# Patient Record
Sex: MALE | Race: OTHER | NOT HISPANIC OR LATINO
[De-identification: names, ages, dates, MRNs, and addresses within clinical notes are randomized per-mention and may not be internally consistent; named-entity substitution may affect disease eponyms.]

---

## 2020-01-20 ENCOUNTER — RESULT REVIEW (OUTPATIENT)
Age: 50
End: 2020-01-20

## 2020-03-18 ENCOUNTER — INPATIENT (INPATIENT)
Facility: HOSPITAL | Age: 50
LOS: 8 days | Discharge: HOME | DRG: 871 | End: 2020-03-27
Attending: HOSPITALIST | Admitting: HOSPITALIST
Payer: COMMERCIAL

## 2020-03-18 VITALS
RESPIRATION RATE: 20 BRPM | SYSTOLIC BLOOD PRESSURE: 149 MMHG | DIASTOLIC BLOOD PRESSURE: 85 MMHG | HEIGHT: 65 IN | OXYGEN SATURATION: 96 % | HEART RATE: 93 BPM | WEIGHT: 125 LBS | TEMPERATURE: 100 F

## 2020-03-18 DIAGNOSIS — Z29.9 ENCOUNTER FOR PROPHYLACTIC MEASURES, UNSPECIFIED: ICD-10-CM

## 2020-03-18 DIAGNOSIS — Z02.9 ENCOUNTER FOR ADMINISTRATIVE EXAMINATIONS, UNSPECIFIED: ICD-10-CM

## 2020-03-18 DIAGNOSIS — R56.9 UNSPECIFIED CONVULSIONS: ICD-10-CM

## 2020-03-18 DIAGNOSIS — A41.9 SEPSIS, UNSPECIFIED ORGANISM: ICD-10-CM

## 2020-03-18 DIAGNOSIS — R06.02 SHORTNESS OF BREATH: ICD-10-CM

## 2020-03-18 DIAGNOSIS — J18.9 PNEUMONIA, UNSPECIFIED ORGANISM: ICD-10-CM

## 2020-03-18 DIAGNOSIS — Z90.49 ACQUIRED ABSENCE OF OTHER SPECIFIED PARTS OF DIGESTIVE TRACT: Chronic | ICD-10-CM

## 2020-03-18 DIAGNOSIS — Z98.890 OTHER SPECIFIED POSTPROCEDURAL STATES: Chronic | ICD-10-CM

## 2020-03-18 DIAGNOSIS — J45.909 UNSPECIFIED ASTHMA, UNCOMPLICATED: ICD-10-CM

## 2020-03-18 LAB
ALBUMIN SERPL ELPH-MCNC: 4 G/DL — SIGNIFICANT CHANGE UP (ref 3.3–5)
ALP SERPL-CCNC: 83 U/L — SIGNIFICANT CHANGE UP (ref 40–120)
ALT FLD-CCNC: 34 U/L — SIGNIFICANT CHANGE UP (ref 10–45)
ANION GAP SERPL CALC-SCNC: 16 MMOL/L — SIGNIFICANT CHANGE UP (ref 5–17)
AST SERPL-CCNC: 37 U/L — SIGNIFICANT CHANGE UP (ref 10–40)
BASOPHILS # BLD AUTO: 0.01 K/UL — SIGNIFICANT CHANGE UP (ref 0–0.2)
BASOPHILS NFR BLD AUTO: 0.2 % — SIGNIFICANT CHANGE UP (ref 0–2)
BILIRUB SERPL-MCNC: 0.7 MG/DL — SIGNIFICANT CHANGE UP (ref 0.2–1.2)
BUN SERPL-MCNC: 16 MG/DL — SIGNIFICANT CHANGE UP (ref 7–23)
CALCIUM SERPL-MCNC: 9.3 MG/DL — SIGNIFICANT CHANGE UP (ref 8.4–10.5)
CHLORIDE SERPL-SCNC: 102 MMOL/L — SIGNIFICANT CHANGE UP (ref 96–108)
CO2 SERPL-SCNC: 21 MMOL/L — LOW (ref 22–31)
CREAT SERPL-MCNC: 0.94 MG/DL — SIGNIFICANT CHANGE UP (ref 0.5–1.3)
EOSINOPHIL # BLD AUTO: 0.01 K/UL — SIGNIFICANT CHANGE UP (ref 0–0.5)
EOSINOPHIL NFR BLD AUTO: 0.2 % — SIGNIFICANT CHANGE UP (ref 0–6)
GAS PNL BLDV: SIGNIFICANT CHANGE UP
GLUCOSE SERPL-MCNC: 95 MG/DL — SIGNIFICANT CHANGE UP (ref 70–99)
HCT VFR BLD CALC: 44.9 % — SIGNIFICANT CHANGE UP (ref 39–50)
HGB BLD-MCNC: 14.6 G/DL — SIGNIFICANT CHANGE UP (ref 13–17)
IMM GRANULOCYTES NFR BLD AUTO: 0.4 % — SIGNIFICANT CHANGE UP (ref 0–1.5)
LYMPHOCYTES # BLD AUTO: 1.03 K/UL — SIGNIFICANT CHANGE UP (ref 1–3.3)
LYMPHOCYTES # BLD AUTO: 18.2 % — SIGNIFICANT CHANGE UP (ref 13–44)
MCHC RBC-ENTMCNC: 29 PG — SIGNIFICANT CHANGE UP (ref 27–34)
MCHC RBC-ENTMCNC: 32.5 GM/DL — SIGNIFICANT CHANGE UP (ref 32–36)
MCV RBC AUTO: 89.1 FL — SIGNIFICANT CHANGE UP (ref 80–100)
MONOCYTES # BLD AUTO: 0.49 K/UL — SIGNIFICANT CHANGE UP (ref 0–0.9)
MONOCYTES NFR BLD AUTO: 8.6 % — SIGNIFICANT CHANGE UP (ref 2–14)
NEUTROPHILS # BLD AUTO: 4.11 K/UL — SIGNIFICANT CHANGE UP (ref 1.8–7.4)
NEUTROPHILS NFR BLD AUTO: 72.4 % — SIGNIFICANT CHANGE UP (ref 43–77)
NRBC # BLD: 0 /100 WBCS — SIGNIFICANT CHANGE UP (ref 0–0)
PLATELET # BLD AUTO: 146 K/UL — LOW (ref 150–400)
POTASSIUM SERPL-MCNC: 4.2 MMOL/L — SIGNIFICANT CHANGE UP (ref 3.5–5.3)
POTASSIUM SERPL-SCNC: 4.2 MMOL/L — SIGNIFICANT CHANGE UP (ref 3.5–5.3)
PROT SERPL-MCNC: 7.7 G/DL — SIGNIFICANT CHANGE UP (ref 6–8.3)
RAPID RVP RESULT: SIGNIFICANT CHANGE UP
RBC # BLD: 5.04 M/UL — SIGNIFICANT CHANGE UP (ref 4.2–5.8)
RBC # FLD: 11.9 % — SIGNIFICANT CHANGE UP (ref 10.3–14.5)
SODIUM SERPL-SCNC: 139 MMOL/L — SIGNIFICANT CHANGE UP (ref 135–145)
WBC # BLD: 5.67 K/UL — SIGNIFICANT CHANGE UP (ref 3.8–10.5)
WBC # FLD AUTO: 5.67 K/UL — SIGNIFICANT CHANGE UP (ref 3.8–10.5)

## 2020-03-18 PROCEDURE — 93010 ELECTROCARDIOGRAM REPORT: CPT

## 2020-03-18 PROCEDURE — 99284 EMERGENCY DEPT VISIT MOD MDM: CPT

## 2020-03-18 PROCEDURE — 99223 1ST HOSP IP/OBS HIGH 75: CPT | Mod: GC

## 2020-03-18 PROCEDURE — 71045 X-RAY EXAM CHEST 1 VIEW: CPT | Mod: 26

## 2020-03-18 RX ORDER — CEFTRIAXONE 500 MG/1
1000 INJECTION, POWDER, FOR SOLUTION INTRAMUSCULAR; INTRAVENOUS EVERY 24 HOURS
Refills: 0 | Status: DISCONTINUED | OUTPATIENT
Start: 2020-03-18 | End: 2020-03-19

## 2020-03-18 RX ORDER — ALBUTEROL 90 UG/1
2 AEROSOL, METERED ORAL EVERY 6 HOURS
Refills: 0 | Status: DISCONTINUED | OUTPATIENT
Start: 2020-03-18 | End: 2020-03-19

## 2020-03-18 RX ORDER — LEVETIRACETAM 250 MG/1
1 TABLET, FILM COATED ORAL
Qty: 0 | Refills: 0 | DISCHARGE

## 2020-03-18 RX ORDER — ALBUTEROL 90 UG/1
2 AEROSOL, METERED ORAL ONCE
Refills: 0 | Status: COMPLETED | OUTPATIENT
Start: 2020-03-18 | End: 2020-03-18

## 2020-03-18 RX ORDER — GUAIFENESIN/DEXTROMETHORPHAN 600MG-30MG
10 TABLET, EXTENDED RELEASE 12 HR ORAL EVERY 4 HOURS
Refills: 0 | Status: DISCONTINUED | OUTPATIENT
Start: 2020-03-18 | End: 2020-03-27

## 2020-03-18 RX ORDER — ENOXAPARIN SODIUM 100 MG/ML
40 INJECTION SUBCUTANEOUS DAILY
Refills: 0 | Status: DISCONTINUED | OUTPATIENT
Start: 2020-03-18 | End: 2020-03-27

## 2020-03-18 RX ORDER — AZITHROMYCIN 500 MG/1
500 TABLET, FILM COATED ORAL EVERY 24 HOURS
Refills: 0 | Status: DISCONTINUED | OUTPATIENT
Start: 2020-03-18 | End: 2020-03-19

## 2020-03-18 RX ORDER — LEVETIRACETAM 250 MG/1
1000 TABLET, FILM COATED ORAL
Refills: 0 | Status: DISCONTINUED | OUTPATIENT
Start: 2020-03-18 | End: 2020-03-18

## 2020-03-18 RX ORDER — LEVETIRACETAM 250 MG/1
500 TABLET, FILM COATED ORAL
Refills: 0 | Status: DISCONTINUED | OUTPATIENT
Start: 2020-03-18 | End: 2020-03-27

## 2020-03-18 RX ORDER — AZITHROMYCIN 500 MG/1
500 TABLET, FILM COATED ORAL ONCE
Refills: 0 | Status: COMPLETED | OUTPATIENT
Start: 2020-03-18 | End: 2020-03-18

## 2020-03-18 RX ORDER — CEFTRIAXONE 500 MG/1
1000 INJECTION, POWDER, FOR SOLUTION INTRAMUSCULAR; INTRAVENOUS ONCE
Refills: 0 | Status: COMPLETED | OUTPATIENT
Start: 2020-03-18 | End: 2020-03-18

## 2020-03-18 RX ORDER — ACETAMINOPHEN 500 MG
975 TABLET ORAL ONCE
Refills: 0 | Status: COMPLETED | OUTPATIENT
Start: 2020-03-18 | End: 2020-03-18

## 2020-03-18 RX ADMIN — Medication 975 MILLIGRAM(S): at 15:15

## 2020-03-18 RX ADMIN — ALBUTEROL 2 PUFF(S): 90 AEROSOL, METERED ORAL at 15:15

## 2020-03-18 RX ADMIN — CEFTRIAXONE 100 MILLIGRAM(S): 500 INJECTION, POWDER, FOR SOLUTION INTRAMUSCULAR; INTRAVENOUS at 17:27

## 2020-03-18 RX ADMIN — Medication 10 MILLILITER(S): at 22:48

## 2020-03-18 RX ADMIN — AZITHROMYCIN 500 MILLIGRAM(S): 500 TABLET, FILM COATED ORAL at 17:27

## 2020-03-18 RX ADMIN — ALBUTEROL 2 PUFF(S): 90 AEROSOL, METERED ORAL at 22:48

## 2020-03-18 RX ADMIN — Medication 975 MILLIGRAM(S): at 17:00

## 2020-03-18 RX ADMIN — LEVETIRACETAM 500 MILLIGRAM(S): 250 TABLET, FILM COATED ORAL at 21:44

## 2020-03-18 NOTE — H&P ADULT - PROBLEM SELECTOR PLAN 4
Does not appear to be in exacerbation, however prone to exacerbation given possible PNA vs viral illness  -c/w albuterol inhaler PRN (no duonebs) suspect coronavirus  -supportive care  -avoid Ibuprofen   -f/u COVID-19

## 2020-03-18 NOTE — ED ADULT NURSE REASSESSMENT NOTE - NS ED NURSE REASSESS COMMENT FT1
Pt given antibiotics for PNA. Shiloh ANTHONY states no blood cultures needed. Pt admitted to medicine for SOB and PNA. Airborne and contact precautions for r/o COVID. Pt resting calmly. VSS

## 2020-03-18 NOTE — ED PROVIDER NOTE - CLINICAL SUMMARY MEDICAL DECISION MAKING FREE TEXT BOX
Attending MD Matamoros: 49M with asthma presenting with cough and dyspnea x 2 days. CXR with multifocal PNA, suspicious for COVID-19 infection. Plan for COVID PCR testing, abx, admission

## 2020-03-18 NOTE — ED PROVIDER NOTE - ATTENDING CONTRIBUTION TO CARE
Attending MD Matamoros:   I personally have seen and examined this patient.  Physician assistant note reviewed and agree on plan of care and except where noted.  See HPI, PE, and MDM for details.

## 2020-03-18 NOTE — H&P ADULT - PROBLEM SELECTOR PLAN 5
-c/w keppra 500mg BID Does not appear to be in exacerbation, however prone to exacerbation given possible PNA vs viral illness  -c/w albuterol inhaler PRN (no duonebs)

## 2020-03-18 NOTE — H&P ADULT - PROBLEM SELECTOR PLAN 7
Transitions of Care Status:  1.  Name of PCP:  2.  PCP Contacted on Admission: [ ] Y    [ ] N    3.  PCP contacted at Discharge: [ ] Y    [ ] N    [ ] N/A  4.  Post-Discharge Appointment Date and Location:  5.  Summary of Handoff given to PCP: Diet: Regular  DVT ppx: HSQ

## 2020-03-18 NOTE — H&P ADULT - NSHPPHYSICALEXAM_GEN_ALL_CORE
Vital Signs Last 24 Hrs  T(C): 37.4 (18 Mar 2020 20:10), Max: 39.4 (18 Mar 2020 15:00)  T(F): 99.3 (18 Mar 2020 20:10), Max: 103 (18 Mar 2020 15:00)  HR: 88 (18 Mar 2020 20:10) (85 - 96)  BP: 135/84 (18 Mar 2020 20:10) (109/71 - 149/85)  BP(mean): --  RR: 24 (18 Mar 2020 20:10) (20 - 24)  SpO2: 99% (18 Mar 2020 20:10) (96% - 100%)    PHYSICAL EXAM:  GENERAL: ill appearing, well-developed  HEAD: Atraumatic, Normocephalic  EYES: EOMI, PERRLA, conjunctiva and sclera clear  NECK: Supple, No JVD  CHEST/LUNG: Diffuse wheezes; Tachypneic, No /rales/rhonchi  HEART: Regular rate and rhythm; No murmurs, rubs, or gallops  ABDOMEN: Soft, Nontender, Nondistended; Bowel sounds present  EXTREMITIES:  2+ dP pulses b/l, No clubbing, cyanosis, or edema  PSYCH: reactive affect  NEUROLOGY: AAOx3, non-focal  SKIN: No rashes or lesions Vital Signs Last 24 Hrs  T(C): 37.4 (18 Mar 2020 20:10), Max: 39.4 (18 Mar 2020 15:00)  T(F): 99.3 (18 Mar 2020 20:10), Max: 103 (18 Mar 2020 15:00)  HR: 88 (18 Mar 2020 20:10) (85 - 96)  BP: 135/84 (18 Mar 2020 20:10) (109/71 - 149/85)  RR: 24 (18 Mar 2020 20:10) (20 - 24)  SpO2: 99% (18 Mar 2020 20:10) (96% - 100%) on 2L nC    PHYSICAL EXAM:  GENERAL: ill appearing, well-developed  HEAD: Atraumatic, Normocephalic  EYES: EOMI, PERRLA, conjunctiva and sclera clear  NECK: Supple, No JVD  LUNG: Diffuse wheezes; Tachypneic, No /rales/rhonchi  HEART: Regular rate and rhythm; No murmurs, rubs, or gallops  ABDOMEN: Soft, Nontender, Nondistended; Bowel sounds present  EXTREMITIES:  2+ dP pulses b/l, No clubbing, cyanosis, or edema  PSYCH: reactive affect  NEUROLOGY: AAOx3, non-focal  SKIN: No rashes or lesions

## 2020-03-18 NOTE — ED PROVIDER NOTE - PROGRESS NOTE DETAILS
YAMILA Mart: This patient is a non-[facility] [facility] employee and was seen in the Emergency Department.  Staff did use appropriate PPE.

## 2020-03-18 NOTE — H&P ADULT - ATTENDING COMMENTS
49M w/ asthma and hx of meningioma c/b seizure s/p craniotomy presents w/ sob and found to have acute respiratory failure with hypoxia s/s left lung pneumonia c/b sepsis.  - concern for COVID19. PCR sent. isolation precautions. RVP neg. c/w ceftriaxone and azithromycin for now. CT chest pending to better characterize lungs. at time of my examination following resident presentation patient requiring 2L NC due to dyspnea and hypoxia.   - sepsis criteria met by fever, tachycardia, pneumonia, lactate wnl, patient appears euvolemic on exam without hypotension and would hold IV fluid bolus at this time. continue with close monitoring      Patient assigned to me by night hospitalist in charge for management and care for patient for this evening only. Care to be resumed by day hospitalist at 08:00 in the morning and thereafter.     Jean Claude Post MD  Department of Hospital Medicine  pager: [phone] (available from 20:00 to 08:00)

## 2020-03-18 NOTE — ED ADULT NURSE NOTE - OBJECTIVE STATEMENT
49y male coming in from home c/o flu-like symptoms. A&Ox3. Hx of asthma. Pt reports flu-like symptoms x few days. 49y male coming in from home c/o flu-like symptoms. A&Ox3 and febrile. Hx of asthma for which he takes albuterol (denies hospitalizations and intubation). Pt reports flu-like symptoms x few days including intermittent sob worsening x 1 week, productive cough x few days (pt swallows mucus, not sure of color) and fever today, Tmax 100.5F. Denies improvement from nebulizers at home. Pt reports taking motrin q4 hours. Denies recent travel or sick contacts. Pt reports shopping in [employer] for 3 hour a few days ago. Denies chest pain. Wheezing noted b/l. COVID tested by MD today in ED.

## 2020-03-18 NOTE — ED PROVIDER NOTE - OBJECTIVE STATEMENT
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 Patient reported dyspnea with speaking, dry non productive cough, wheezing, and minimal improvement with inhaler taken BID. Patient now became febrile Tmax 101 orally and associated body aches. Patient has been taking Motrin every 4 hours around the clock with no improvement. Patient wife is sick with a "cold". Patient stated he is prone to bronchitis and pneumonia. Had the flu shot this year. Patient is a non smoker. Patient denied CP, abdominal pain, rash, contact with known COVID pts, travel, N/V/D, headache, neck pain, palpitations

## 2020-03-18 NOTE — H&P ADULT - NSHPLABSRESULTS_GEN_ALL_CORE
EKG:  NSR, RBBB, VR 93, , , QT//429 Personally reviewed available labs, imaging and ekg     Labs  CBC Full  -  ( 18 Mar 2020 15:32 )  WBC Count : 5.67 K/uL  RBC Count : 5.04 M/uL  Hemoglobin : 14.6 g/dL  Hematocrit : 44.9 %  Platelet Count - Automated : 146 K/uL  Mean Cell Volume : 89.1 fl  Mean Cell Hemoglobin : 29.0 pg  Mean Cell Hemoglobin Concentration : 32.5 gm/dL  Auto Neutrophil # : 4.11 K/uL  Auto Lymphocyte # : 1.03 K/uL  Auto Monocyte # : 0.49 K/uL  Auto Eosinophil # : 0.01 K/uL  Auto Basophil # : 0.01 K/uL  Auto Neutrophil % : 72.4 %  Auto Lymphocyte % : 18.2 %  Auto Monocyte % : 8.6 %  Auto Eosinophil % : 0.2 %  Auto Basophil % : 0.2 %    03-18    139  |  102  |  16  ----------------------------<  95  4.2   |  21<L>  |  0.94    Ca    9.3      18 Mar 2020 15:32    TPro  7.7  /  Alb  4.0  /  TBili  0.7  /  DBili  x   /  AST  37  /  ALT  34  /  AlkPhos  83  03-18        CAPILLARY BLOOD GLUCOSE  15:32 - VBG - pH: 7.42  | pCO2: 43    | pO2: 38    | Lactate: 1.2      Rapid RVP Result: NotDetec: (03.18.20 @ 15:32)      Imaging  CXR with left lung hazy opacification  EKG:  NSR, RBBB, VR 93, , , QT//429

## 2020-03-18 NOTE — H&P ADULT - HISTORY OF PRESENT ILLNESS
49 year old male with asthma on prn albuterol (never hospitalized/ never intubated), meningioma s/p cranioplasty c/b seizures (on keppra 1g BID), presenting with complaints of worsening shortness of breath. Patient stated that he first noticed  shortness of breath and non-productive cough for the past week, and has been experiencing fevers for the past two days. His shortness of breath has progressively worsened  to the point were it is affecting his ability to speak, and is also associated with wheezing. He only achieved minimal improvement with inhaler taken twice daily. He reported fevers up to 101 orally, for which he has been taking Ibuprofen every 4 hours, without significant improvement. He also endorsed generalized body aches. Patient reported that he is prone to bronchitis and pneumonia. He stated that is wife is sick with a "cold." He denied any  associated CP, palpitations, abdominal pain, nausea, vomiting, diarrhea. No recent travel. Of note patient works for [employer], but has been working from home for the past two weeks. 49 year old male with asthma on prn albuterol (never hospitalized/ never intubated), meningioma s/p cranioplasty c/b seizures (on keppra 500mg BID), presenting with complaints of worsening shortness of breath. Patient stated that he first noticed  shortness of breath and non-productive cough for the past week, and has been experiencing fevers for the past two days. His shortness of breath has progressively worsened  to the point were it is affecting his ability to speak, and is also associated with wheezing. He only achieved minimal improvement with inhaler taken twice daily. He reported fevers up to 101 orally, for which he has been taking Ibuprofen every 4 hours, without significant improvement. He also endorsed generalized body aches. Patient reported that he is prone to bronchitis and pneumonia. He stated that is wife is sick with a "cold." He denied any  associated CP, palpitations, abdominal pain, nausea, vomiting, diarrhea. No recent travel. Of note patient works for [employer], but has been working from home for the past two weeks. 49 year old male with asthma on prn albuterol (never hospitalized/ never intubated), meningioma s/p cranioplasty c/b seizures (on keppra 500mg BID), presenting with complaints of worsening shortness of breath. Patient stated that he first noticed  shortness of breath and non-productive cough for the past week, and has been experiencing fevers for the past two days. His shortness of breath has progressively worsened  to the point were it is affecting his ability to speak, and is also associated with wheezing. He only achieved minimal improvement with inhaler taken twice daily. He reported fevers up to 101 orally, for which he has been taking Ibuprofen every 4 hours, without significant improvement. He also endorsed generalized body aches. Patient reported that he is prone to bronchitis and pneumonia. He stated that is wife is sick with a "cold" (patient's wife is a teacher). He denied any  associated CP, palpitations, abdominal pain, nausea, vomiting, diarrhea. No recent travel. Of note patient works for [employer], but has been working from home for the past two weeks.

## 2020-03-18 NOTE — ED ADULT NURSE REASSESSMENT NOTE - NS ED NURSE REASSESS COMMENT FT1
Report already given to [facility]. Pt awaiting transport to admitted bed. Report also given to ED RN Caleb to ensure safe transport up to admitted bed. VSS

## 2020-03-18 NOTE — H&P ADULT - PROBLEM SELECTOR PLAN 2
Pt with SOB  suspect in the setting of PNA vs viral illness  -monitor O2 and give supplemental oxygen as indicated  -further management asa above see above  - ct chest to better characterize

## 2020-03-18 NOTE — H&P ADULT - PROBLEM SELECTOR PLAN 1
Pt with sepsis (febrile, tachypneic, HR >90) with suspected lung infection. PNA vs viral syndrome (r/o COVID). CXR with Hazy opacity in the left lower lung  -s/p ceftriaxone and azithro in the ED, continue   -f/u bcx, COVID-19  -f/u CT chest hypoxia w/ left lung opacification c/w pneumonia still requires eval for viral vs bacterial  -initially had dyspnea and when traveled from ED to floor found to have hypoxia now requiring 2LNC. discussed with RN that if hypoxia worsens to notify covering team  - concern for viral infection. COVID19 PCR collected. pending CT chest to better characterize  - c/w ceftriaxone and azithromycin

## 2020-03-18 NOTE — H&P ADULT - PROBLEM SELECTOR PLAN 3
suspect coronavirus  -supportive care  -avoid Ibuprofen   -f/u COVID-19 Pt with sepsis (febrile, tachypneic, HR >90) with suspected lung infection. PNA vs viral syndrome (r/o COVID). CXR with Hazy opacity in the left lower lung  -s/p ceftriaxone and azithro in the ED, continue   -f/u bcx, COVID-19  -f/u CT chest

## 2020-03-18 NOTE — ED ADULT NURSE REASSESSMENT NOTE - NS ED NURSE REASSESS COMMENT FT1
[employer] food provided. Pt receive admitted bed assignment on [facility]. Report given to Rodolfo MUNGUIA. Barron DRISCOLL states Chest CT may be performed when pt upstairs in admitted bed. VSS

## 2020-03-18 NOTE — H&P ADULT - NSHPREVIEWOFSYSTEMS_GEN_ALL_CORE
Constitutional: chills, night sweats, weight loss. +fevers,  HEENT: denies visual changes, hearing changes, rhinitis, odynophagia, or dysphagia  Cardiovascular: denies palpitations, chest pain, edema  Respiratory: +SOB, +wheezing, +cough  Gastrointestinal: denies N/V/D, abdominal pain, hematochezia, melena  : denies dysuria, hematuria  MSK: denies weakness, +myalgias  Neuro: no numbness or tingling  Psych: no depression or anxiety  Skin: denies new rashes or masses Constitutional: chills, night sweats, weight loss. +fevers,  HEENT: denies visual changes, hearing changes, rhinitis, odynophagia, or dysphagia  Cardiovascular: denies palpitations, chest pain, edema  Respiratory: +SOB, +wheezing, +cough  Gastrointestinal: denies N/V/D, abdominal pain, hematochezia, melena  : denies dysuria, hematuria  MSK: denies weakness, +myalgias  Neuro: no numbness or tingling  Psych: no depression or anxiety  Skin: denies new rashes or masses  Heme: no easy bruising and no bleeding

## 2020-03-18 NOTE — H&P ADULT - ASSESSMENT
49 year old male with asthma on prn albuterol (never hospitalized/ never intubated), meningioma s/p cranioplasty c/b seizures (on keppra 1g BID), presenting with complaints of worsening shortness of breath. Admitted for sepsis, suspect in the setting of lung pathology PNA vs viral (r/o COVID-19)

## 2020-03-19 DIAGNOSIS — J18.9 PNEUMONIA, UNSPECIFIED ORGANISM: ICD-10-CM

## 2020-03-19 DIAGNOSIS — J96.01 ACUTE RESPIRATORY FAILURE WITH HYPOXIA: ICD-10-CM

## 2020-03-19 LAB
ALBUMIN SERPL ELPH-MCNC: 3.8 G/DL — SIGNIFICANT CHANGE UP (ref 3.3–5)
ALP SERPL-CCNC: 73 U/L — SIGNIFICANT CHANGE UP (ref 40–120)
ALT FLD-CCNC: 34 U/L — SIGNIFICANT CHANGE UP (ref 10–45)
ANION GAP SERPL CALC-SCNC: 15 MMOL/L — SIGNIFICANT CHANGE UP (ref 5–17)
AST SERPL-CCNC: 33 U/L — SIGNIFICANT CHANGE UP (ref 10–40)
BASOPHILS # BLD AUTO: 0.01 K/UL — SIGNIFICANT CHANGE UP (ref 0–0.2)
BASOPHILS NFR BLD AUTO: 0.2 % — SIGNIFICANT CHANGE UP (ref 0–2)
BILIRUB SERPL-MCNC: 0.7 MG/DL — SIGNIFICANT CHANGE UP (ref 0.2–1.2)
BUN SERPL-MCNC: 16 MG/DL — SIGNIFICANT CHANGE UP (ref 7–23)
CALCIUM SERPL-MCNC: 8.7 MG/DL — SIGNIFICANT CHANGE UP (ref 8.4–10.5)
CHLORIDE SERPL-SCNC: 98 MMOL/L — SIGNIFICANT CHANGE UP (ref 96–108)
CO2 SERPL-SCNC: 21 MMOL/L — LOW (ref 22–31)
CREAT SERPL-MCNC: 1 MG/DL — SIGNIFICANT CHANGE UP (ref 0.5–1.3)
EOSINOPHIL # BLD AUTO: 0 K/UL — SIGNIFICANT CHANGE UP (ref 0–0.5)
EOSINOPHIL NFR BLD AUTO: 0 % — SIGNIFICANT CHANGE UP (ref 0–6)
GLUCOSE SERPL-MCNC: 102 MG/DL — HIGH (ref 70–99)
HCT VFR BLD CALC: 39.8 % — SIGNIFICANT CHANGE UP (ref 39–50)
HGB BLD-MCNC: 13.1 G/DL — SIGNIFICANT CHANGE UP (ref 13–17)
IMM GRANULOCYTES NFR BLD AUTO: 0.5 % — SIGNIFICANT CHANGE UP (ref 0–1.5)
LYMPHOCYTES # BLD AUTO: 1.01 K/UL — SIGNIFICANT CHANGE UP (ref 1–3.3)
LYMPHOCYTES # BLD AUTO: 17.1 % — SIGNIFICANT CHANGE UP (ref 13–44)
MAGNESIUM SERPL-MCNC: 1.7 MG/DL — SIGNIFICANT CHANGE UP (ref 1.6–2.6)
MCHC RBC-ENTMCNC: 28.9 PG — SIGNIFICANT CHANGE UP (ref 27–34)
MCHC RBC-ENTMCNC: 32.9 GM/DL — SIGNIFICANT CHANGE UP (ref 32–36)
MCV RBC AUTO: 87.9 FL — SIGNIFICANT CHANGE UP (ref 80–100)
MONOCYTES # BLD AUTO: 0.5 K/UL — SIGNIFICANT CHANGE UP (ref 0–0.9)
MONOCYTES NFR BLD AUTO: 8.5 % — SIGNIFICANT CHANGE UP (ref 2–14)
NEUTROPHILS # BLD AUTO: 4.34 K/UL — SIGNIFICANT CHANGE UP (ref 1.8–7.4)
NEUTROPHILS NFR BLD AUTO: 73.7 % — SIGNIFICANT CHANGE UP (ref 43–77)
NRBC # BLD: 0 /100 WBCS — SIGNIFICANT CHANGE UP (ref 0–0)
PHOSPHATE SERPL-MCNC: 2.9 MG/DL — SIGNIFICANT CHANGE UP (ref 2.5–4.5)
PLATELET # BLD AUTO: 149 K/UL — LOW (ref 150–400)
POTASSIUM SERPL-MCNC: 3.5 MMOL/L — SIGNIFICANT CHANGE UP (ref 3.5–5.3)
POTASSIUM SERPL-SCNC: 3.5 MMOL/L — SIGNIFICANT CHANGE UP (ref 3.5–5.3)
PROT SERPL-MCNC: 7.1 G/DL — SIGNIFICANT CHANGE UP (ref 6–8.3)
RBC # BLD: 4.53 M/UL — SIGNIFICANT CHANGE UP (ref 4.2–5.8)
RBC # FLD: 11.9 % — SIGNIFICANT CHANGE UP (ref 10.3–14.5)
SARS-COV-2 RNA SPEC QL NAA+PROBE: DETECTED
SODIUM SERPL-SCNC: 134 MMOL/L — LOW (ref 135–145)
WBC # BLD: 5.89 K/UL — SIGNIFICANT CHANGE UP (ref 3.8–10.5)
WBC # FLD AUTO: 5.89 K/UL — SIGNIFICANT CHANGE UP (ref 3.8–10.5)

## 2020-03-19 PROCEDURE — 71250 CT THORAX DX C-: CPT | Mod: 26

## 2020-03-19 PROCEDURE — 99233 SBSQ HOSP IP/OBS HIGH 50: CPT

## 2020-03-19 RX ORDER — ALBUTEROL 90 UG/1
2 AEROSOL, METERED ORAL EVERY 4 HOURS
Refills: 0 | Status: DISCONTINUED | OUTPATIENT
Start: 2020-03-19 | End: 2020-03-27

## 2020-03-19 RX ORDER — ACETAMINOPHEN 500 MG
650 TABLET ORAL ONCE
Refills: 0 | Status: COMPLETED | OUTPATIENT
Start: 2020-03-19 | End: 2020-03-19

## 2020-03-19 RX ADMIN — LEVETIRACETAM 500 MILLIGRAM(S): 250 TABLET, FILM COATED ORAL at 17:02

## 2020-03-19 RX ADMIN — LEVETIRACETAM 500 MILLIGRAM(S): 250 TABLET, FILM COATED ORAL at 06:24

## 2020-03-19 RX ADMIN — ENOXAPARIN SODIUM 40 MILLIGRAM(S): 100 INJECTION SUBCUTANEOUS at 12:02

## 2020-03-19 RX ADMIN — Medication 650 MILLIGRAM(S): at 20:49

## 2020-03-19 RX ADMIN — Medication 10 MILLILITER(S): at 17:30

## 2020-03-19 NOTE — PROGRESS NOTE ADULT - SUBJECTIVE AND OBJECTIVE BOX
Patient is a 49y old  Male who presents with a chief complaint of Dyspnea, COVID r/o (18 Mar 2020 20:06)    SUBJECTIVE / OVERNIGHT EVENTS: pt feels short of breath after coughing. Still has cough, feeling unwell with body aches.     MEDICATIONS  (STANDING):  azithromycin  IVPB 500 milliGRAM(s) IV Intermittent every 24 hours  cefTRIAXone   IVPB 1000 milliGRAM(s) IV Intermittent every 24 hours  enoxaparin Injectable 40 milliGRAM(s) SubCutaneous daily  levETIRAcetam 500 milliGRAM(s) Oral two times a day    MEDICATIONS  (PRN):  ALBUTerol    90 MICROgram(s) HFA Inhaler 2 Puff(s) Inhalation every 6 hours PRN Shortness of Breath and/or Wheezing  guaifenesin/dextromethorphan  Syrup 10 milliLiter(s) Oral every 4 hours PRN Cough      Vital Signs Last 24 Hrs  T(C): 36.8 (19 Mar 2020 09:00), Max: 39.4 (18 Mar 2020 15:00)  T(F): 98.2 (19 Mar 2020 09:00), Max: 103 (18 Mar 2020 15:00)  HR: 88 (19 Mar 2020 09:00) (85 - 96)  BP: 112/74 (19 Mar 2020 09:00) (109/71 - 135/84)  BP(mean): --  RR: 18 (19 Mar 2020 09:00) (18 - 24)  SpO2: 95% (19 Mar 2020 09:00) (92% - 100%)  CAPILLARY BLOOD GLUCOSE        I&O's Summary    18 Mar 2020 07:01  -  19 Mar 2020 07:00  --------------------------------------------------------  IN: 720 mL / OUT: 600 mL / NET: 120 mL    19 Mar 2020 07:01  -  19 Mar 2020 12:43  --------------------------------------------------------  IN: 480 mL / OUT: 300 mL / NET: 180 mL    PHYSICAL EXAM:  GENERAL: NAD  EYES:  conjunctiva and sclera clear  CHEST/LUNG: mild ronchi, no wheezing   HEART: +S1/S2, reg   ABDOMEN: Soft, Nontender, Nondistended  EXTREMITIES: No LE edema   PSYCH: AAOx3      LABS:                        13.1   5.89  )-----------( 149      ( 19 Mar 2020 07:36 )             39.8     03-19    134<L>  |  98  |  16  ----------------------------<  102<H>  3.5   |  21<L>  |  1.00    Ca    8.7      19 Mar 2020 07:36  Phos  2.9     03-19  Mg     1.7     03-19    TPro  7.1  /  Alb  3.8  /  TBili  0.7  /  DBili  x   /  AST  33  /  ALT  34  /  AlkPhos  73  03-19

## 2020-03-19 NOTE — PROGRESS NOTE ADULT - PROBLEM SELECTOR PLAN 1
hypoxia w/ left lung opacification c/w pneumonia still requires eval for viral vs bacterial  -initially had dyspnea and when traveled from ED to floor found to have hypoxia requiring O2   - concern for viral infection. COVID19 PCR collected  - was off supplemental O2 but still feeling dyspneic at times after coughing   - monitor O2 sats  - CT lung findings concerning for COVID, PCR is in lab, f/u results

## 2020-03-19 NOTE — PROGRESS NOTE ADULT - PROBLEM SELECTOR PLAN 2
-see above  -pt has low procalcitonin, likelihood of concurrent bacerial PNA is low   -will d/c abx for now and observe

## 2020-03-20 LAB
ALBUMIN SERPL ELPH-MCNC: 3.9 G/DL — SIGNIFICANT CHANGE UP (ref 3.3–5)
ALP SERPL-CCNC: 78 U/L — SIGNIFICANT CHANGE UP (ref 40–120)
ALT FLD-CCNC: 40 U/L — SIGNIFICANT CHANGE UP (ref 10–45)
ANION GAP SERPL CALC-SCNC: 14 MMOL/L — SIGNIFICANT CHANGE UP (ref 5–17)
AST SERPL-CCNC: 36 U/L — SIGNIFICANT CHANGE UP (ref 10–40)
BASOPHILS # BLD AUTO: 0.01 K/UL — SIGNIFICANT CHANGE UP (ref 0–0.2)
BASOPHILS NFR BLD AUTO: 0.2 % — SIGNIFICANT CHANGE UP (ref 0–2)
BILIRUB SERPL-MCNC: 0.6 MG/DL — SIGNIFICANT CHANGE UP (ref 0.2–1.2)
BUN SERPL-MCNC: 15 MG/DL — SIGNIFICANT CHANGE UP (ref 7–23)
CALCIUM SERPL-MCNC: 9.1 MG/DL — SIGNIFICANT CHANGE UP (ref 8.4–10.5)
CHLORIDE SERPL-SCNC: 99 MMOL/L — SIGNIFICANT CHANGE UP (ref 96–108)
CO2 SERPL-SCNC: 22 MMOL/L — SIGNIFICANT CHANGE UP (ref 22–31)
CREAT SERPL-MCNC: 0.96 MG/DL — SIGNIFICANT CHANGE UP (ref 0.5–1.3)
CRP SERPL-MCNC: 10.91 MG/DL — HIGH (ref 0–0.4)
EOSINOPHIL # BLD AUTO: 0.01 K/UL — SIGNIFICANT CHANGE UP (ref 0–0.5)
EOSINOPHIL NFR BLD AUTO: 0.2 % — SIGNIFICANT CHANGE UP (ref 0–6)
FERRITIN SERPL-MCNC: 413 NG/ML — HIGH (ref 30–400)
GLUCOSE SERPL-MCNC: 122 MG/DL — HIGH (ref 70–99)
HCT VFR BLD CALC: 40.5 % — SIGNIFICANT CHANGE UP (ref 39–50)
HGB BLD-MCNC: 13.8 G/DL — SIGNIFICANT CHANGE UP (ref 13–17)
IMM GRANULOCYTES NFR BLD AUTO: 0.3 % — SIGNIFICANT CHANGE UP (ref 0–1.5)
LEGIONELLA AG UR QL: NEGATIVE — SIGNIFICANT CHANGE UP
LYMPHOCYTES # BLD AUTO: 1.03 K/UL — SIGNIFICANT CHANGE UP (ref 1–3.3)
LYMPHOCYTES # BLD AUTO: 15.5 % — SIGNIFICANT CHANGE UP (ref 13–44)
MCHC RBC-ENTMCNC: 29.7 PG — SIGNIFICANT CHANGE UP (ref 27–34)
MCHC RBC-ENTMCNC: 34.1 GM/DL — SIGNIFICANT CHANGE UP (ref 32–36)
MCV RBC AUTO: 87.1 FL — SIGNIFICANT CHANGE UP (ref 80–100)
MONOCYTES # BLD AUTO: 0.41 K/UL — SIGNIFICANT CHANGE UP (ref 0–0.9)
MONOCYTES NFR BLD AUTO: 6.2 % — SIGNIFICANT CHANGE UP (ref 2–14)
NEUTROPHILS # BLD AUTO: 5.18 K/UL — SIGNIFICANT CHANGE UP (ref 1.8–7.4)
NEUTROPHILS NFR BLD AUTO: 77.6 % — HIGH (ref 43–77)
NRBC # BLD: 0 /100 WBCS — SIGNIFICANT CHANGE UP (ref 0–0)
PLATELET # BLD AUTO: 156 K/UL — SIGNIFICANT CHANGE UP (ref 150–400)
POTASSIUM SERPL-MCNC: 3.8 MMOL/L — SIGNIFICANT CHANGE UP (ref 3.5–5.3)
POTASSIUM SERPL-SCNC: 3.8 MMOL/L — SIGNIFICANT CHANGE UP (ref 3.5–5.3)
PROCALCITONIN SERPL-MCNC: 0.1 NG/ML — SIGNIFICANT CHANGE UP (ref 0.02–0.1)
PROT SERPL-MCNC: 7.6 G/DL — SIGNIFICANT CHANGE UP (ref 6–8.3)
RBC # BLD: 4.65 M/UL — SIGNIFICANT CHANGE UP (ref 4.2–5.8)
RBC # FLD: 11.9 % — SIGNIFICANT CHANGE UP (ref 10.3–14.5)
SODIUM SERPL-SCNC: 135 MMOL/L — SIGNIFICANT CHANGE UP (ref 135–145)
WBC # BLD: 6.66 K/UL — SIGNIFICANT CHANGE UP (ref 3.8–10.5)
WBC # FLD AUTO: 6.66 K/UL — SIGNIFICANT CHANGE UP (ref 3.8–10.5)

## 2020-03-20 PROCEDURE — 99233 SBSQ HOSP IP/OBS HIGH 50: CPT

## 2020-03-20 PROCEDURE — 99223 1ST HOSP IP/OBS HIGH 75: CPT

## 2020-03-20 RX ORDER — HYDROXYCHLOROQUINE SULFATE 200 MG
200 TABLET ORAL EVERY 12 HOURS
Refills: 0 | Status: COMPLETED | OUTPATIENT
Start: 2020-03-21 | End: 2020-03-24

## 2020-03-20 RX ORDER — BUDESONIDE AND FORMOTEROL FUMARATE DIHYDRATE 160; 4.5 UG/1; UG/1
2 AEROSOL RESPIRATORY (INHALATION)
Refills: 0 | Status: DISCONTINUED | OUTPATIENT
Start: 2020-03-20 | End: 2020-03-27

## 2020-03-20 RX ORDER — HYDROXYCHLOROQUINE SULFATE 200 MG
400 TABLET ORAL EVERY 12 HOURS
Refills: 0 | Status: COMPLETED | OUTPATIENT
Start: 2020-03-20 | End: 2020-03-21

## 2020-03-20 RX ORDER — ACETAMINOPHEN 500 MG
650 TABLET ORAL EVERY 6 HOURS
Refills: 0 | Status: DISCONTINUED | OUTPATIENT
Start: 2020-03-20 | End: 2020-03-27

## 2020-03-20 RX ADMIN — ALBUTEROL 2 PUFF(S): 90 AEROSOL, METERED ORAL at 20:14

## 2020-03-20 RX ADMIN — ALBUTEROL 2 PUFF(S): 90 AEROSOL, METERED ORAL at 05:40

## 2020-03-20 RX ADMIN — Medication 10 MILLILITER(S): at 12:00

## 2020-03-20 RX ADMIN — Medication 650 MILLIGRAM(S): at 15:22

## 2020-03-20 RX ADMIN — Medication 10 MILLILITER(S): at 16:05

## 2020-03-20 RX ADMIN — ALBUTEROL 2 PUFF(S): 90 AEROSOL, METERED ORAL at 16:06

## 2020-03-20 RX ADMIN — ENOXAPARIN SODIUM 40 MILLIGRAM(S): 100 INJECTION SUBCUTANEOUS at 11:26

## 2020-03-20 RX ADMIN — Medication 10 MILLILITER(S): at 05:46

## 2020-03-20 RX ADMIN — BUDESONIDE AND FORMOTEROL FUMARATE DIHYDRATE 2 PUFF(S): 160; 4.5 AEROSOL RESPIRATORY (INHALATION) at 18:12

## 2020-03-20 RX ADMIN — LEVETIRACETAM 500 MILLIGRAM(S): 250 TABLET, FILM COATED ORAL at 17:19

## 2020-03-20 RX ADMIN — LEVETIRACETAM 500 MILLIGRAM(S): 250 TABLET, FILM COATED ORAL at 05:37

## 2020-03-20 RX ADMIN — Medication 400 MILLIGRAM(S): at 17:18

## 2020-03-20 NOTE — PROGRESS NOTE ADULT - SUBJECTIVE AND OBJECTIVE BOX
Patient is a 49y old  Male who presents with a chief complaint of Dyspnea, COVID r/o (19 Mar 2020 12:42)    SUBJECTIVE / OVERNIGHT EVENTS: patient feels short of breath/tight at times, still has cough. Myalgias better.     MEDICATIONS  (STANDING):  enoxaparin Injectable 40 milliGRAM(s) SubCutaneous daily  levETIRAcetam 500 milliGRAM(s) Oral two times a day    MEDICATIONS  (PRN):  acetaminophen   Tablet .. 650 milliGRAM(s) Oral every 6 hours PRN Temp greater or equal to 38C (100.4F)  ALBUTerol    90 MICROgram(s) HFA Inhaler 2 Puff(s) Inhalation every 4 hours PRN Shortness of Breath and/or Wheezing  guaifenesin/dextromethorphan  Syrup 10 milliLiter(s) Oral every 4 hours PRN Cough      Vital Signs Last 24 Hrs  T(C): 38.7 (20 Mar 2020 14:46), Max: 38.7 (20 Mar 2020 14:46)  T(F): 101.7 (20 Mar 2020 14:46), Max: 101.7 (20 Mar 2020 14:46)  HR: 81 (20 Mar 2020 14:46) (72 - 88)  BP: 105/52 (20 Mar 2020 14:46) (105/52 - 151/90)  BP(mean): --  RR: 18 (20 Mar 2020 14:46) (18 - 20)  SpO2: 91% (20 Mar 2020 14:46) (91% - 98%)  CAPILLARY BLOOD GLUCOSE      I&O's Summary    19 Mar 2020 07:01  -  20 Mar 2020 07:00  --------------------------------------------------------  IN: 960 mL / OUT: 1600 mL / NET: -640 mL      PHYSICAL EXAM:  GENERAL: ill appearing  EYES: conjunctiva and sclera clear  NECK: Supple, No JVD  CHEST/LUNG: decreased air movement b/l, no wheezing   HEART: +S1/S2, reg   ABDOMEN: Soft, Nontender, Nondistended  EXTREMITIES: no LE edema   PSYCH: AAOx3      LABS:                        13.8   6.66  )-----------( 156      ( 20 Mar 2020 07:19 )             40.5     03-20    135  |  99  |  15  ----------------------------<  122<H>  3.8   |  22  |  0.96    Ca    9.1      20 Mar 2020 07:19  Phos  2.9     03-19  Mg     1.7     03-19    TPro  7.6  /  Alb  3.9  /  TBili  0.6  /  DBili  x   /  AST  36  /  ALT  40  /  AlkPhos  78  03-20

## 2020-03-20 NOTE — PROGRESS NOTE ADULT - ASSESSMENT
49 year old male with asthma on prn albuterol (never hospitalized/ never intubated), meningioma s/p cranioplasty c/b seizures (on keppra 1g BID), presenting with complaints of worsening shortness of breath, found to be COVID-19 positive:

## 2020-03-20 NOTE — CONSULT NOTE ADULT - SUBJECTIVE AND OBJECTIVE BOX
HPI:    PAST MEDICAL & SURGICAL HISTORY:  Seizure  Meningioma  Asthma  History of appendectomy  H/O hernia repair      FAMILY HISTORY:  FH: CAD (coronary artery disease): Father      SOCIAL HISTORY:  Smoking: __ packs x ___ years  EtOH Use:  Marital Status:  Occupation:  Exposures:  Recent Travel:    Allergies    No Known Allergies    Intolerances        HOME MEDICATIONS:    REVIEW OF SYSTEMS:  Constitutional: No fevers or chills. No weight loss. No fatigue or generalised malaise.  Eyes: No itching or discharge from the eyes  ENT: No ear pain. No ear discharge. No nasal congestion. No post nasal drip. No epistaxis. No throat pain. No sore throat. No difficulty swallowing.   CV: No chest pain. No palpitations. No lightheadedness or dizziness.   Resp: No dyspnea at rest. No dyspnea on exertion. No orthopnea. No wheezing. No cough. No stridor. No sputum production. No chest pain with respiration.  GI: No nausea. No vomiting. No diarrhea.  MSK: No joint pain or pain in any extremities  Integumentary: No skin lesions. No pedal edema.  Neurological: No gross motor weakness. No sensory changes.    [ ] All other systems negative  [ ] Unable to assess ROS because ________    OBJECTIVE:  ICU Vital Signs Last 24 Hrs  T(C): 37.3 (20 Mar 2020 15:26), Max: 38.7 (20 Mar 2020 14:46)  T(F): 99.2 (20 Mar 2020 15:26), Max: 101.7 (20 Mar 2020 14:46)  HR: 89 (20 Mar 2020 15:26) (72 - 89)  BP: 129/86 (20 Mar 2020 15:26) (105/52 - 151/90)  BP(mean): --  ABP: --  ABP(mean): --  RR: 18 (20 Mar 2020 15:26) (18 - 20)  SpO2: 90% (20 Mar 2020 15:26) (90% - 98%)        03-19 @ 07:01  -  03-20 @ 07:00  --------------------------------------------------------  IN: 960 mL / OUT: 1600 mL / NET: -640 mL      CAPILLARY BLOOD GLUCOSE          PHYSICAL EXAM:  General: Awake, alert, oriented X 3.   HEENT: Atraumatic, normocephalic.                 Mallampatti Grade                 No nasal congestion.                No tonsillar or pharyngeal exudates.  Lymph Nodes: No palpable lymphadenopathy  Neck: No JVD. No carotid bruit.   Respiratory: Normal chest expansion                         Normal percussion                         Normal and equal air entry                         No wheeze, rhonchi or rales.  Cardiovascular: S1 S2 normal. No murmurs, rubs or gallops.   Abdomen: Soft, non-tender, non-distended. No organomegaly.  Extremities: Warm to touch. Peripheral pulse palpable. No pedal edema.   Skin: No rashes or skin lesions  Neurological: Motor and sensory examination equal and normal in all four extremities.  Psychiatry: Appropriate mood and affect.    HOSPITAL MEDICATIONS:  MEDICATIONS  (STANDING):  enoxaparin Injectable 40 milliGRAM(s) SubCutaneous daily  hydroxychloroquine 400 milliGRAM(s) Oral every 12 hours  levETIRAcetam 500 milliGRAM(s) Oral two times a day    MEDICATIONS  (PRN):  acetaminophen   Tablet .. 650 milliGRAM(s) Oral every 6 hours PRN Temp greater or equal to 38C (100.4F)  ALBUTerol    90 MICROgram(s) HFA Inhaler 2 Puff(s) Inhalation every 4 hours PRN Shortness of Breath and/or Wheezing  guaifenesin/dextromethorphan  Syrup 10 milliLiter(s) Oral every 4 hours PRN Cough      LABS:                        13.8   6.66  )-----------( 156      ( 20 Mar 2020 07:19 )             40.5     03-20    135  |  99  |  15  ----------------------------<  122<H>  3.8   |  22  |  0.96    Ca    9.1      20 Mar 2020 07:19  Phos  2.9     03-19  Mg     1.7     03-19    TPro  7.6  /  Alb  3.9  /  TBili  0.6  /  DBili  x   /  AST  36  /  ALT  40  /  AlkPhos  78  03-20              MICROBIOLOGY:   COVID-19 PCR  (03.18.20 @ 17:52) Detected    Culture Results:   No growth to date. (03.19.20 @ 00:52)    Rapid RVP Result: NotDetec: This Respiratory Panel does NOT detect the 2019 novel coronavirus  (2019-nCoV) involved with the outbreak originating in [facility].  This Respiratory Panel uses polymerase chain reaction (PCR) to detect for  adenovirus; coronavirus (HKU1, NL63, 229E, OC43); human metapneumovirus  (hMPV); human enterovirus/rhinovirus (Entero/RV); influenza A; influenza  A/H1; influenza A/H3; influenza A/H1-2009; influenza B; parainfluenza  viruses 1, 2, 3, 4; respiratory syncytial virus; Mycoplasma pneumoniae;  and Chlamydophila pneumoniae. (03.18.20 @ 15:32)        RADIOLOGY:  [x] Reviewed and interpreted by me  < from: CT Chest No Cont (03.19.20 @ 09:29) >  EXAM:  CT CHEST                            PROCEDURE DATE:  03/19/2020            INTERPRETATION:  CLINICAL INFORMATION: Persistent cough    COMPARISON: None.    PROCEDURE:   CT of the Chest was performed without intravenous contrast.  Sagittal andcoronal reformats were performed.      FINDINGS:    LUNGS AND AIRWAYS: No endobronchial lesion.  Multiple bilateral nodular and patchy opacities majority of which have a mixed solid and groundglass appearance throughout the mid to upper lungs, most confluent within the left upper lobe.    PLEURA: Minimal left pleural fluid.    MEDIASTINUM AND OCTAVIO: No lymphadenopathy.    VESSELS: Within normal limits.    HEART: Heart size is normal. No pericardial effusion.    CHEST WALL AND LOWER NECK: Within normal limits.    VISUALIZED UPPER ABDOMEN: Small hiatal hernia.    BONES: Within normal limits.    IMPRESSION:     Nodular and patchy opacities as described above noted throughout the bilateral lungs suggestive of infection. Differential diagnosis includes viral pneumonia (such as COVID 19)    Findings were discussed with GUSTABO Springer by Dr. Stout on 3/19/2020 at 9:55 AM with read back.      ANDREA STOUT M.D., RADIOLOGY RESIDENT  This document has been electronically signed.  JOHNNY VIVAS M.D. ATTENDING RADIOLOGIST  This document has been electronically signed. Mar 19 2020  1:15PM    < end of copied text >      PFT:    EKG: HPI:  49 year old male with asthma admitted with shortness of breath, found to be COVID19 positive. Asthma is usually well controlled, on prn albuterol which he rarely uses. He was never hospitalized for asthma and was never intubated. He does not use inhaled corticosteroids. He works for [employer] in [employer], has no known direct exposures. He has fevers, shortness of breath, non-productive cough. Wife and child has asthma and is on home quarantine.   Currently on Proventil q4h prn. Was on Ceftriaxone and Azithromycin but discontinued as low suspicion for bacterial infection. Patient continues to feel short of breath.    PAST MEDICAL & SURGICAL HISTORY:  Seizure  Meningioma  Asthma  History of appendectomy  H/O hernia repair    FAMILY HISTORY:  FH: CAD (coronary artery disease): Father    SOCIAL HISTORY:  Smoking: Never smoker  EtOH Use:  Marital Status:   Occupation: [employer]      Allergies  No Known Allergies      HOME MEDICATIONS:  albuterol 90 mcg/inh inhalation aerosol: 2 puff(s) inhaled , As Needed (18 Mar 2020 21:54)  Keppra 500 mg oral tablet: 1 tab(s) orally 2 times a day (18 Mar 2020 21:54)      REVIEW OF SYSTEMS:  Constitutional: Fevers, chills, malaise.   Eyes: No itching or discharge from the eyes  ENT: No ear pain. No ear discharge. No nasal congestion. No post nasal drip. No epistaxis. No throat pain. No sore throat. No difficulty swallowing.   CV: No chest pain. No palpitations. No lightheadedness or dizziness.   Resp: Shortness of breath. Chest tightness.  GI: No nausea. No vomiting. No diarrhea.  MSK: Generalized weakness  Integumentary: No skin lesions.    [x] All other systems negative  [ ] Unable to assess ROS because ________    OBJECTIVE:  ICU Vital Signs Last 24 Hrs  T(C): 37.3 (20 Mar 2020 15:26), Max: 38.7 (20 Mar 2020 14:46)  T(F): 99.2 (20 Mar 2020 15:26), Max: 101.7 (20 Mar 2020 14:46)  HR: 89 (20 Mar 2020 15:26) (72 - 89)  BP: 129/86 (20 Mar 2020 15:26) (105/52 - 151/90)  RR: 18 (20 Mar 2020 15:26) (18 - 20)  SpO2: 90% (20 Mar 2020 15:26) (90% - 98%)        03-19 @ 07:01  -  03-20 @ 07:00  --------------------------------------------------------  IN: 960 mL / OUT: 1600 mL / NET: -640 mL      PHYSICAL EXAM:  General: Awake, alert, oriented X 3.   HEENT: Atraumatic, normocephalic.   Lymph Nodes: No palpable lymphadenopathy  Neck: No JVD.   Respiratory: Normal chest expansion. No wheezing currently.  Cardiovascular: S1 S2 normal. No murmurs, rubs or gallops.   Abdomen: Soft, non-tender, non-distended. No organomegaly.  Extremities: Warm to touch. Peripheral pulse palpable. No pedal edema.   Skin: No rashes or skin lesions  Neurological: No focal decifits  Psychiatry: Appropriate mood and affect.    HOSPITAL MEDICATIONS:  MEDICATIONS  (STANDING):  enoxaparin Injectable 40 milliGRAM(s) SubCutaneous daily  hydroxychloroquine 400 milliGRAM(s) Oral every 12 hours  levETIRAcetam 500 milliGRAM(s) Oral two times a day    MEDICATIONS  (PRN):  acetaminophen   Tablet .. 650 milliGRAM(s) Oral every 6 hours PRN Temp greater or equal to 38C (100.4F)  ALBUTerol    90 MICROgram(s) HFA Inhaler 2 Puff(s) Inhalation every 4 hours PRN Shortness of Breath and/or Wheezing  guaifenesin/dextromethorphan  Syrup 10 milliLiter(s) Oral every 4 hours PRN Cough      LABS:                        13.8   6.66  )-----------( 156      ( 20 Mar 2020 07:19 )             40.5     03-20    135  |  99  |  15  ----------------------------<  122<H>  3.8   |  22  |  0.96    Ca    9.1      20 Mar 2020 07:19  Phos  2.9     03-19  Mg     1.7     03-19    TPro  7.6  /  Alb  3.9  /  TBili  0.6  /  DBili  x   /  AST  36  /  ALT  40  /  AlkPhos  78  03-20              MICROBIOLOGY:   COVID-19 PCR  (03.18.20 @ 17:52) Detected    Culture Results:   No growth to date. (03.19.20 @ 00:52)    Rapid RVP Result: NotDetec: This Respiratory Panel does NOT detect the 2019 novel coronavirus  (2019-nCoV) involved with the outbreak originating in [facility].  This Respiratory Panel uses polymerase chain reaction (PCR) to detect for  adenovirus; coronavirus (HKU1, NL63, 229E, OC43); human metapneumovirus  (hMPV); human enterovirus/rhinovirus (Entero/RV); influenza A; influenza  A/H1; influenza A/H3; influenza A/H1-2009; influenza B; parainfluenza  viruses 1, 2, 3, 4; respiratory syncytial virus; Mycoplasma pneumoniae;  and Chlamydophila pneumoniae. (03.18.20 @ 15:32)        RADIOLOGY:  [x] Reviewed and interpreted by me  < from: CT Chest No Cont (03.19.20 @ 09:29) >  EXAM:  CT CHEST                            PROCEDURE DATE:  03/19/2020            INTERPRETATION:  CLINICAL INFORMATION: Persistent cough    COMPARISON: None.    PROCEDURE:   CT of the Chest was performed without intravenous contrast.  Sagittal andcoronal reformats were performed.      FINDINGS:    LUNGS AND AIRWAYS: No endobronchial lesion.  Multiple bilateral nodular and patchy opacities majority of which have a mixed solid and groundglass appearance throughout the mid to upper lungs, most confluent within the left upper lobe.    PLEURA: Minimal left pleural fluid.    MEDIASTINUM AND OCTAVIO: No lymphadenopathy.    VESSELS: Within normal limits.    HEART: Heart size is normal. No pericardial effusion.    CHEST WALL AND LOWER NECK: Within normal limits.    VISUALIZED UPPER ABDOMEN: Small hiatal hernia.    BONES: Within normal limits.    IMPRESSION:     Nodular and patchy opacities as described above noted throughout the bilateral lungs suggestive of infection. Differential diagnosis includes viral pneumonia (such as COVID 19)    Findings were discussed with GUSTABO Springer by Dr. Stout on 3/19/2020 at 9:55 AM with read back.      ANDREA STOUT M.D., RADIOLOGY RESIDENT  This document has been electronically signed.  JOHNNY VIVAS M.D. ATTENDING RADIOLOGIST  This document has been electronically signed. Mar 19 2020  1:15PM    < end of copied text >

## 2020-03-20 NOTE — CONSULT NOTE ADULT - ATTENDING COMMENTS
49 year old male with asthma with COVID19 infection  - Monitor ESR, T cell subsets, D dimer, LDH, ferritin, CK and troponin, coags every 3 days  - Isolation Precautions  - Supplemental O2 to maintain SpO2 >88%  - Please do not give HFNC, BIPAP/CPAP (NRB is okay), or nebs while the patient is being ruled out  - Add Symbicort 160/4.5 2 puffs MDI, and continue albuterol Q4H prn. Please provider spacer for the patient  - Agree with adding hydroxychloroquine and would also resume Azithromycin 500 mg to complete 5 day course for anti-inflammatory effects  - Generally have been avoiding systemic steroids in COVID19 + patients but if bronchospastic would start prednisone 40 mg but can hold off for now.  - For questions over the weekend call [phone].

## 2020-03-20 NOTE — PROGRESS NOTE ADULT - PROBLEM SELECTOR PLAN 4
-c/w albuterol inhaler PRN (no duonebs)  -?role of steroid inhalers? pulm consult placed  -no oral/IV steroids at this time

## 2020-03-21 DIAGNOSIS — B34.2 CORONAVIRUS INFECTION, UNSPECIFIED: ICD-10-CM

## 2020-03-21 PROCEDURE — 99233 SBSQ HOSP IP/OBS HIGH 50: CPT

## 2020-03-21 RX ORDER — AZITHROMYCIN 500 MG/1
500 TABLET, FILM COATED ORAL EVERY 24 HOURS
Refills: 0 | Status: DISCONTINUED | OUTPATIENT
Start: 2020-03-22 | End: 2020-03-27

## 2020-03-21 RX ORDER — AZITHROMYCIN 500 MG/1
500 TABLET, FILM COATED ORAL ONCE
Refills: 0 | Status: COMPLETED | OUTPATIENT
Start: 2020-03-21 | End: 2020-03-21

## 2020-03-21 RX ORDER — AZITHROMYCIN 500 MG/1
TABLET, FILM COATED ORAL
Refills: 0 | Status: DISCONTINUED | OUTPATIENT
Start: 2020-03-21 | End: 2020-03-27

## 2020-03-21 RX ADMIN — BUDESONIDE AND FORMOTEROL FUMARATE DIHYDRATE 2 PUFF(S): 160; 4.5 AEROSOL RESPIRATORY (INHALATION) at 17:01

## 2020-03-21 RX ADMIN — Medication 10 MILLILITER(S): at 17:01

## 2020-03-21 RX ADMIN — Medication 650 MILLIGRAM(S): at 17:01

## 2020-03-21 RX ADMIN — Medication 650 MILLIGRAM(S): at 06:20

## 2020-03-21 RX ADMIN — Medication 200 MILLIGRAM(S): at 17:01

## 2020-03-21 RX ADMIN — BUDESONIDE AND FORMOTEROL FUMARATE DIHYDRATE 2 PUFF(S): 160; 4.5 AEROSOL RESPIRATORY (INHALATION) at 05:44

## 2020-03-21 RX ADMIN — Medication 650 MILLIGRAM(S): at 01:28

## 2020-03-21 RX ADMIN — LEVETIRACETAM 500 MILLIGRAM(S): 250 TABLET, FILM COATED ORAL at 05:45

## 2020-03-21 RX ADMIN — ENOXAPARIN SODIUM 40 MILLIGRAM(S): 100 INJECTION SUBCUTANEOUS at 11:39

## 2020-03-21 RX ADMIN — Medication 10 MILLILITER(S): at 12:18

## 2020-03-21 RX ADMIN — LEVETIRACETAM 500 MILLIGRAM(S): 250 TABLET, FILM COATED ORAL at 17:01

## 2020-03-21 RX ADMIN — Medication 400 MILLIGRAM(S): at 05:45

## 2020-03-21 RX ADMIN — ALBUTEROL 2 PUFF(S): 90 AEROSOL, METERED ORAL at 12:18

## 2020-03-21 RX ADMIN — Medication 650 MILLIGRAM(S): at 18:00

## 2020-03-21 RX ADMIN — Medication 650 MILLIGRAM(S): at 05:50

## 2020-03-21 RX ADMIN — Medication 650 MILLIGRAM(S): at 00:58

## 2020-03-21 RX ADMIN — ALBUTEROL 2 PUFF(S): 90 AEROSOL, METERED ORAL at 01:00

## 2020-03-21 RX ADMIN — AZITHROMYCIN 250 MILLIGRAM(S): 500 TABLET, FILM COATED ORAL at 12:09

## 2020-03-21 NOTE — PROGRESS NOTE ADULT - SUBJECTIVE AND OBJECTIVE BOX
Patient is a 49y old  Male who presents with a chief complaint of Dyspnea, COVID r/o (20 Mar 2020 16:01)    SUBJECTIVE / OVERNIGHT EVENTS: patient still having difficulty breathing, coughing.     MEDICATIONS  (STANDING):  azithromycin  IVPB      budesonide 160 MICROgram(s)/formoterol 4.5 MICROgram(s) Inhaler 2 Puff(s) Inhalation two times a day  enoxaparin Injectable 40 milliGRAM(s) SubCutaneous daily  hydroxychloroquine 200 milliGRAM(s) Oral every 12 hours  levETIRAcetam 500 milliGRAM(s) Oral two times a day    MEDICATIONS  (PRN):  acetaminophen   Tablet .. 650 milliGRAM(s) Oral every 6 hours PRN Temp greater or equal to 38C (100.4F)  ALBUTerol    90 MICROgram(s) HFA Inhaler 2 Puff(s) Inhalation every 4 hours PRN Shortness of Breath and/or Wheezing  guaifenesin/dextromethorphan  Syrup 10 milliLiter(s) Oral every 4 hours PRN Cough      Vital Signs Last 24 Hrs  T(C): 36.4 (21 Mar 2020 12:09), Max: 39.3 (21 Mar 2020 00:55)  T(F): 97.6 (21 Mar 2020 12:09), Max: 102.8 (21 Mar 2020 00:55)  HR: 85 (21 Mar 2020 12:09) (74 - 89)  BP: 138/82 (21 Mar 2020 12:09) (119/80 - 142/90)  BP(mean): --  RR: 20 (21 Mar 2020 12:09) (18 - 20)  SpO2: 92% (21 Mar 2020 12:09) (90% - 96%)  CAPILLARY BLOOD GLUCOSE        I&O's Summary    20 Mar 2020 07:01  -  21 Mar 2020 07:00  --------------------------------------------------------  IN: 550 mL / OUT: 900 mL / NET: -350 mL    21 Mar 2020 07:01  -  21 Mar 2020 15:14  --------------------------------------------------------  IN: 0 mL / OUT: 450 mL / NET: -450 mL    PHYSICAL EXAM:  GENERAL: ill appearing   EYES: conjunctiva and sclera clear  CHEST/LUNG: decreased air movement b/l, no accessory muscle use   HEART: +S1/S2, reg   ABDOMEN: Soft, Nontender, Nondistended  EXTREMITIES: No LE edema   PSYCH: AAOx3    LABS:                        13.8   6.66  )-----------( 156      ( 20 Mar 2020 07:19 )             40.5     03-20    135  |  99  |  15  ----------------------------<  122<H>  3.8   |  22  |  0.96    Ca    9.1      20 Mar 2020 07:19    TPro  7.6  /  Alb  3.9  /  TBili  0.6  /  DBili  x   /  AST  36  /  ALT  40  /  AlkPhos  78  03-20

## 2020-03-21 NOTE — PROGRESS NOTE ADULT - PROBLEM SELECTOR PLAN 3
-see above  -pt has low procalcitonin, likelihood of concurrent bacerial PNA is low   -observing off abx

## 2020-03-21 NOTE — PROVIDER CONTACT NOTE (OTHER) - ACTION/TREATMENT ORDERED:
YAMILA Weinberg notified, cool packs applied. Tylenol 650  PO given. YAMILA Weinberg notified, cool packs applied. Tylenol 650  PO given. Encouraged patient to increase fluids

## 2020-03-21 NOTE — PROGRESS NOTE ADULT - PROBLEM SELECTOR PLAN 4
-c/w albuterol inhaler PRN (no duonebs)  -no oral/IV steroids at this time  -continue with symbicort  -appreciate pulm recs   -azithromycin resumed for anti-inflammatory effects

## 2020-03-21 NOTE — PROGRESS NOTE ADULT - PROBLEM SELECTOR PLAN 2
-continue with plaquenil, day 2/5   -increased oxygenation requirements  -avoid ibuprofen  -CRP and ferritin are elevated

## 2020-03-21 NOTE — PROGRESS NOTE ADULT - PROBLEM SELECTOR PLAN 1
-secondary to COVID 19 infection  -continue with supportive care  -increased oxygenation requirements

## 2020-03-21 NOTE — PROVIDER CONTACT NOTE (OTHER) - ASSESSMENT
pt a&o4, denies SOB, chest pain, distress. pt asleep in bed. pt a&o4, denies SOB, chest pain, distress. pt asleep in bed. pt on 4L o2, cont pulse ox.

## 2020-03-22 LAB
ANION GAP SERPL CALC-SCNC: 16 MMOL/L — SIGNIFICANT CHANGE UP (ref 5–17)
BUN SERPL-MCNC: 15 MG/DL — SIGNIFICANT CHANGE UP (ref 7–23)
CALCIUM SERPL-MCNC: 8.7 MG/DL — SIGNIFICANT CHANGE UP (ref 8.4–10.5)
CHLORIDE SERPL-SCNC: 97 MMOL/L — SIGNIFICANT CHANGE UP (ref 96–108)
CO2 SERPL-SCNC: 22 MMOL/L — SIGNIFICANT CHANGE UP (ref 22–31)
CREAT SERPL-MCNC: 1.02 MG/DL — SIGNIFICANT CHANGE UP (ref 0.5–1.3)
D DIMER BLD IA.RAPID-MCNC: <150 NG/ML DDU — SIGNIFICANT CHANGE UP
ERYTHROCYTE [SEDIMENTATION RATE] IN BLOOD: 100 MM/HR — HIGH (ref 0–20)
FERRITIN SERPL-MCNC: 563 NG/ML — HIGH (ref 30–400)
GLUCOSE SERPL-MCNC: 111 MG/DL — HIGH (ref 70–99)
HCT VFR BLD CALC: 37.7 % — LOW (ref 39–50)
HGB BLD-MCNC: 13 G/DL — SIGNIFICANT CHANGE UP (ref 13–17)
INR BLD: 1.3 RATIO — HIGH (ref 0.88–1.16)
LDH SERPL L TO P-CCNC: 292 U/L — HIGH (ref 50–242)
MCHC RBC-ENTMCNC: 30 PG — SIGNIFICANT CHANGE UP (ref 27–34)
MCHC RBC-ENTMCNC: 34.5 GM/DL — SIGNIFICANT CHANGE UP (ref 32–36)
MCV RBC AUTO: 87.1 FL — SIGNIFICANT CHANGE UP (ref 80–100)
NRBC # BLD: 0 /100 WBCS — SIGNIFICANT CHANGE UP (ref 0–0)
PLATELET # BLD AUTO: 197 K/UL — SIGNIFICANT CHANGE UP (ref 150–400)
POTASSIUM SERPL-MCNC: 4.2 MMOL/L — SIGNIFICANT CHANGE UP (ref 3.5–5.3)
POTASSIUM SERPL-SCNC: 4.2 MMOL/L — SIGNIFICANT CHANGE UP (ref 3.5–5.3)
PROTHROM AB SERPL-ACNC: 15.1 SEC — HIGH (ref 10–13.1)
RBC # BLD: 4.33 M/UL — SIGNIFICANT CHANGE UP (ref 4.2–5.8)
RBC # FLD: 11.9 % — SIGNIFICANT CHANGE UP (ref 10.3–14.5)
SODIUM SERPL-SCNC: 135 MMOL/L — SIGNIFICANT CHANGE UP (ref 135–145)
TROPONIN T, HIGH SENSITIVITY RESULT: 16 NG/L — SIGNIFICANT CHANGE UP (ref 0–51)
WBC # BLD: 11.25 K/UL — HIGH (ref 3.8–10.5)
WBC # FLD AUTO: 11.25 K/UL — HIGH (ref 3.8–10.5)

## 2020-03-22 PROCEDURE — 99233 SBSQ HOSP IP/OBS HIGH 50: CPT

## 2020-03-22 RX ORDER — SODIUM CHLORIDE 0.65 %
1 AEROSOL, SPRAY (ML) NASAL ONCE
Refills: 0 | Status: COMPLETED | OUTPATIENT
Start: 2020-03-22 | End: 2020-03-22

## 2020-03-22 RX ORDER — ACETAMINOPHEN 500 MG
1000 TABLET ORAL ONCE
Refills: 0 | Status: COMPLETED | OUTPATIENT
Start: 2020-03-22 | End: 2020-03-22

## 2020-03-22 RX ADMIN — BUDESONIDE AND FORMOTEROL FUMARATE DIHYDRATE 2 PUFF(S): 160; 4.5 AEROSOL RESPIRATORY (INHALATION) at 17:57

## 2020-03-22 RX ADMIN — Medication 200 MILLIGRAM(S): at 17:56

## 2020-03-22 RX ADMIN — Medication 200 MILLIGRAM(S): at 06:10

## 2020-03-22 RX ADMIN — BUDESONIDE AND FORMOTEROL FUMARATE DIHYDRATE 2 PUFF(S): 160; 4.5 AEROSOL RESPIRATORY (INHALATION) at 06:10

## 2020-03-22 RX ADMIN — LEVETIRACETAM 500 MILLIGRAM(S): 250 TABLET, FILM COATED ORAL at 06:10

## 2020-03-22 RX ADMIN — Medication 1 SPRAY(S): at 11:36

## 2020-03-22 RX ADMIN — LEVETIRACETAM 500 MILLIGRAM(S): 250 TABLET, FILM COATED ORAL at 17:56

## 2020-03-22 RX ADMIN — AZITHROMYCIN 250 MILLIGRAM(S): 500 TABLET, FILM COATED ORAL at 11:35

## 2020-03-22 RX ADMIN — Medication 650 MILLIGRAM(S): at 06:10

## 2020-03-22 RX ADMIN — Medication 650 MILLIGRAM(S): at 16:18

## 2020-03-22 RX ADMIN — Medication 650 MILLIGRAM(S): at 06:11

## 2020-03-22 RX ADMIN — Medication 400 MILLIGRAM(S): at 21:25

## 2020-03-22 RX ADMIN — ENOXAPARIN SODIUM 40 MILLIGRAM(S): 100 INJECTION SUBCUTANEOUS at 11:35

## 2020-03-22 NOTE — PROGRESS NOTE ADULT - PROBLEM SELECTOR PLAN 1
-secondary to COVID 19 infection  -continue with supportive care  -increased oxygenation requirements  - Continue Hydroxychloroquine 200mg PO BID and Azithromycin 500mg PO daily -secondary to COVID 19 infection  -continue with supportive care  -increased oxygenation requirements pt high risk for decompensation given asthma and if becomes hypoxic or tachypneic on non rebreather consult MICU early.   - Continue Hydroxychloroquine 200mg PO BID and Azithromycin 500mg PO daily

## 2020-03-22 NOTE — PROGRESS NOTE ADULT - PROBLEM SELECTOR PLAN 3
-see above  -pt has low procalcitonin, likelihood of concurrent bacterial PNA is low   -observing off abx

## 2020-03-22 NOTE — PROGRESS NOTE ADULT - SUBJECTIVE AND OBJECTIVE BOX
PROGRESS NOTE:     Patient is a 49y old  Male who presents with a chief complaint of Dyspnea, COVID r/o (21 Mar 2020 15:10)      SUBJECTIVE / OVERNIGHT EVENTS: Required Non rebreather overnight with fevers but now weaned down to 5L NC    ADDITIONAL REVIEW OF SYSTEMS:  +fevers or chills   No  n/v/d    MEDICATIONS  (STANDING):  azithromycin  IVPB      azithromycin  IVPB 500 milliGRAM(s) IV Intermittent every 24 hours  budesonide 160 MICROgram(s)/formoterol 4.5 MICROgram(s) Inhaler 2 Puff(s) Inhalation two times a day  enoxaparin Injectable 40 milliGRAM(s) SubCutaneous daily  hydroxychloroquine 200 milliGRAM(s) Oral every 12 hours  levETIRAcetam 500 milliGRAM(s) Oral two times a day    MEDICATIONS  (PRN):  acetaminophen   Tablet .. 650 milliGRAM(s) Oral every 6 hours PRN Temp greater or equal to 38C (100.4F)  ALBUTerol    90 MICROgram(s) HFA Inhaler 2 Puff(s) Inhalation every 4 hours PRN Shortness of Breath and/or Wheezing  guaifenesin/dextromethorphan  Syrup 10 milliLiter(s) Oral every 4 hours PRN Cough      CAPILLARY BLOOD GLUCOSE        I&O's Summary    21 Mar 2020 07:01  -  22 Mar 2020 07:00  --------------------------------------------------------  IN: 360 mL / OUT: 1250 mL / NET: -890 mL    22 Mar 2020 07:01  -  22 Mar 2020 13:30  --------------------------------------------------------  IN: 240 mL / OUT: 0 mL / NET: 240 mL        PHYSICAL EXAM:  Vital Signs Last 24 Hrs  T(C): 35.3 (22 Mar 2020 09:30), Max: 38.8 (22 Mar 2020 05:46)  T(F): 95.5 (22 Mar 2020 09:30), Max: 101.9 (22 Mar 2020 05:46)  HR: 77 (22 Mar 2020 09:30) (74 - 93)  BP: 122/83 (22 Mar 2020 09:30) (122/83 - 126/82)  BP(mean): --  RR: 25 (22 Mar 2020 09:30) (20 - 30)  SpO2: 94% (22 Mar 2020 09:30) (92% - 94%)    CONSTITUTIONAL: NAD, well-developed, ill appearing but awake, arousable and able to answer all questions   RESPIRATORY: Diffuse wheezing and rales  CARDIOVASCULAR: Regular rate and rhythm, normal S1 and S2, no murmur/rub/gallop; No lower extremity edema; Peripheral pulses are 2+ bilaterally  ABDOMEN: Nontender to palpation, normoactive bowel sounds, no rebound/guarding; No hepatosplenomegaly  MUSCLOSKELETAL: no clubbing or cyanosis of digits; no joint swelling or tenderness to palpation  PSYCH: A+O to person, place, and time; affect appropriate    LABS:                        13.0   11.25 )-----------( 197      ( 22 Mar 2020 07:10 )             37.7     03-22    135  |  97  |  15  ----------------------------<  111<H>  4.2   |  22  |  1.02    Ca    8.7      22 Mar 2020 07:10      PT/INR - ( 22 Mar 2020 09:47 )   PT: 15.1 sec;   INR: 1.30 ratio                     RADIOLOGY & ADDITIONAL TESTS:  Results Reviewed:   Imaging Personally Reviewed:  Electrocardiogram Personally Reviewed:    COORDINATION OF CARE:  Care Discussed with Consultants/Other Providers [Y/N]:  Prior or Outpatient Records Reviewed [Y/N]:

## 2020-03-22 NOTE — PROGRESS NOTE ADULT - PROBLEM SELECTOR PLAN 2
-continue with plaquenil, day 3/5  with Azithromycin   -increased oxygenation requirements  -avoid ibuprofen  -CRP and ferritin are elevated

## 2020-03-23 LAB
ALBUMIN SERPL ELPH-MCNC: 3.2 G/DL — LOW (ref 3.3–5)
ALBUMIN SERPL ELPH-MCNC: 3.7 G/DL — SIGNIFICANT CHANGE UP (ref 3.3–5)
ALP SERPL-CCNC: 105 U/L — SIGNIFICANT CHANGE UP (ref 40–120)
ALP SERPL-CCNC: 95 U/L — SIGNIFICANT CHANGE UP (ref 40–120)
ALT FLD-CCNC: 38 U/L — SIGNIFICANT CHANGE UP (ref 10–45)
ALT FLD-CCNC: 45 U/L — SIGNIFICANT CHANGE UP (ref 10–45)
ANION GAP SERPL CALC-SCNC: 14 MMOL/L — SIGNIFICANT CHANGE UP (ref 5–17)
ANION GAP SERPL CALC-SCNC: 15 MMOL/L — SIGNIFICANT CHANGE UP (ref 5–17)
AST SERPL-CCNC: 40 U/L — SIGNIFICANT CHANGE UP (ref 10–40)
AST SERPL-CCNC: 60 U/L — HIGH (ref 10–40)
BASE EXCESS BLDA CALC-SCNC: 2.5 MMOL/L — HIGH (ref -2–2)
BASOPHILS # BLD AUTO: 0.01 K/UL — SIGNIFICANT CHANGE UP (ref 0–0.2)
BASOPHILS # BLD AUTO: 0.02 K/UL — SIGNIFICANT CHANGE UP (ref 0–0.2)
BASOPHILS NFR BLD AUTO: 0.1 % — SIGNIFICANT CHANGE UP (ref 0–2)
BASOPHILS NFR BLD AUTO: 0.1 % — SIGNIFICANT CHANGE UP (ref 0–2)
BILIRUB SERPL-MCNC: 1 MG/DL — SIGNIFICANT CHANGE UP (ref 0.2–1.2)
BILIRUB SERPL-MCNC: 1.2 MG/DL — SIGNIFICANT CHANGE UP (ref 0.2–1.2)
BUN SERPL-MCNC: 12 MG/DL — SIGNIFICANT CHANGE UP (ref 7–23)
BUN SERPL-MCNC: 14 MG/DL — SIGNIFICANT CHANGE UP (ref 7–23)
CALCIUM SERPL-MCNC: 8.7 MG/DL — SIGNIFICANT CHANGE UP (ref 8.4–10.5)
CALCIUM SERPL-MCNC: 9.2 MG/DL — SIGNIFICANT CHANGE UP (ref 8.4–10.5)
CHLORIDE SERPL-SCNC: 96 MMOL/L — SIGNIFICANT CHANGE UP (ref 96–108)
CHLORIDE SERPL-SCNC: 97 MMOL/L — SIGNIFICANT CHANGE UP (ref 96–108)
CK SERPL-CCNC: 446 U/L — HIGH (ref 30–200)
CO2 BLDA-SCNC: 27 MMOL/L — SIGNIFICANT CHANGE UP (ref 22–30)
CO2 SERPL-SCNC: 23 MMOL/L — SIGNIFICANT CHANGE UP (ref 22–31)
CO2 SERPL-SCNC: 25 MMOL/L — SIGNIFICANT CHANGE UP (ref 22–31)
CREAT SERPL-MCNC: 0.85 MG/DL — SIGNIFICANT CHANGE UP (ref 0.5–1.3)
CREAT SERPL-MCNC: 1.05 MG/DL — SIGNIFICANT CHANGE UP (ref 0.5–1.3)
EOSINOPHIL # BLD AUTO: 0 K/UL — SIGNIFICANT CHANGE UP (ref 0–0.5)
EOSINOPHIL # BLD AUTO: 0 K/UL — SIGNIFICANT CHANGE UP (ref 0–0.5)
EOSINOPHIL NFR BLD AUTO: 0 % — SIGNIFICANT CHANGE UP (ref 0–6)
EOSINOPHIL NFR BLD AUTO: 0 % — SIGNIFICANT CHANGE UP (ref 0–6)
ERYTHROCYTE [SEDIMENTATION RATE] IN BLOOD: 90 MM/HR — HIGH (ref 0–20)
FERRITIN SERPL-MCNC: 766 NG/ML — HIGH (ref 30–400)
GAS PNL BLDA: SIGNIFICANT CHANGE UP
GLUCOSE SERPL-MCNC: 105 MG/DL — HIGH (ref 70–99)
GLUCOSE SERPL-MCNC: 125 MG/DL — HIGH (ref 70–99)
HCO3 BLDA-SCNC: 26 MMOL/L — SIGNIFICANT CHANGE UP (ref 21–29)
HCT VFR BLD CALC: 39.8 % — SIGNIFICANT CHANGE UP (ref 39–50)
HCT VFR BLD CALC: 40 % — SIGNIFICANT CHANGE UP (ref 39–50)
HGB BLD-MCNC: 12.8 G/DL — LOW (ref 13–17)
HGB BLD-MCNC: 13 G/DL — SIGNIFICANT CHANGE UP (ref 13–17)
IMM GRANULOCYTES NFR BLD AUTO: 0.6 % — SIGNIFICANT CHANGE UP (ref 0–1.5)
IMM GRANULOCYTES NFR BLD AUTO: 0.7 % — SIGNIFICANT CHANGE UP (ref 0–1.5)
LDH SERPL L TO P-CCNC: 580 U/L — HIGH (ref 50–242)
LYMPHOCYTES # BLD AUTO: 0.89 K/UL — LOW (ref 1–3.3)
LYMPHOCYTES # BLD AUTO: 0.9 K/UL — LOW (ref 1–3.3)
LYMPHOCYTES # BLD AUTO: 6.7 % — LOW (ref 13–44)
LYMPHOCYTES # BLD AUTO: 8.1 % — LOW (ref 13–44)
MAGNESIUM SERPL-MCNC: 2.3 MG/DL — SIGNIFICANT CHANGE UP (ref 1.6–2.6)
MAGNESIUM SERPL-MCNC: 2.5 MG/DL — SIGNIFICANT CHANGE UP (ref 1.6–2.6)
MCHC RBC-ENTMCNC: 28.8 PG — SIGNIFICANT CHANGE UP (ref 27–34)
MCHC RBC-ENTMCNC: 29.1 PG — SIGNIFICANT CHANGE UP (ref 27–34)
MCHC RBC-ENTMCNC: 32 GM/DL — SIGNIFICANT CHANGE UP (ref 32–36)
MCHC RBC-ENTMCNC: 32.7 GM/DL — SIGNIFICANT CHANGE UP (ref 32–36)
MCV RBC AUTO: 89.2 FL — SIGNIFICANT CHANGE UP (ref 80–100)
MCV RBC AUTO: 90.1 FL — SIGNIFICANT CHANGE UP (ref 80–100)
MONOCYTES # BLD AUTO: 0.51 K/UL — SIGNIFICANT CHANGE UP (ref 0–0.9)
MONOCYTES # BLD AUTO: 0.52 K/UL — SIGNIFICANT CHANGE UP (ref 0–0.9)
MONOCYTES NFR BLD AUTO: 3.9 % — SIGNIFICANT CHANGE UP (ref 2–14)
MONOCYTES NFR BLD AUTO: 4.6 % — SIGNIFICANT CHANGE UP (ref 2–14)
MRSA PCR RESULT.: SIGNIFICANT CHANGE UP
NEUTROPHILS # BLD AUTO: 11.84 K/UL — HIGH (ref 1.8–7.4)
NEUTROPHILS # BLD AUTO: 9.63 K/UL — HIGH (ref 1.8–7.4)
NEUTROPHILS NFR BLD AUTO: 86.6 % — HIGH (ref 43–77)
NEUTROPHILS NFR BLD AUTO: 88.6 % — HIGH (ref 43–77)
NRBC # BLD: 0 /100 WBCS — SIGNIFICANT CHANGE UP (ref 0–0)
NRBC # BLD: 0 /100 WBCS — SIGNIFICANT CHANGE UP (ref 0–0)
PCO2 BLDA: 35 MMHG — SIGNIFICANT CHANGE UP (ref 32–46)
PH BLDA: 7.47 — HIGH (ref 7.35–7.45)
PHOSPHATE SERPL-MCNC: 3.1 MG/DL — SIGNIFICANT CHANGE UP (ref 2.5–4.5)
PHOSPHATE SERPL-MCNC: 3.9 MG/DL — SIGNIFICANT CHANGE UP (ref 2.5–4.5)
PLATELET # BLD AUTO: 199 K/UL — SIGNIFICANT CHANGE UP (ref 150–400)
PLATELET # BLD AUTO: 211 K/UL — SIGNIFICANT CHANGE UP (ref 150–400)
PO2 BLDA: 101 MMHG — SIGNIFICANT CHANGE UP (ref 74–108)
POTASSIUM SERPL-MCNC: 3.9 MMOL/L — SIGNIFICANT CHANGE UP (ref 3.5–5.3)
POTASSIUM SERPL-MCNC: 5.3 MMOL/L — SIGNIFICANT CHANGE UP (ref 3.5–5.3)
POTASSIUM SERPL-SCNC: 3.9 MMOL/L — SIGNIFICANT CHANGE UP (ref 3.5–5.3)
POTASSIUM SERPL-SCNC: 5.3 MMOL/L — SIGNIFICANT CHANGE UP (ref 3.5–5.3)
PROT SERPL-MCNC: 7.7 G/DL — SIGNIFICANT CHANGE UP (ref 6–8.3)
PROT SERPL-MCNC: 7.7 G/DL — SIGNIFICANT CHANGE UP (ref 6–8.3)
RBC # BLD: 4.44 M/UL — SIGNIFICANT CHANGE UP (ref 4.2–5.8)
RBC # BLD: 4.46 M/UL — SIGNIFICANT CHANGE UP (ref 4.2–5.8)
RBC # FLD: 12 % — SIGNIFICANT CHANGE UP (ref 10.3–14.5)
RBC # FLD: 12.2 % — SIGNIFICANT CHANGE UP (ref 10.3–14.5)
S AUREUS DNA NOSE QL NAA+PROBE: SIGNIFICANT CHANGE UP
SAO2 % BLDA: 98 % — HIGH (ref 92–96)
SODIUM SERPL-SCNC: 133 MMOL/L — LOW (ref 135–145)
SODIUM SERPL-SCNC: 137 MMOL/L — SIGNIFICANT CHANGE UP (ref 135–145)
TROPONIN T, HIGH SENSITIVITY RESULT: 14 NG/L — SIGNIFICANT CHANGE UP (ref 0–51)
WBC # BLD: 11.12 K/UL — HIGH (ref 3.8–10.5)
WBC # BLD: 13.37 K/UL — HIGH (ref 3.8–10.5)
WBC # FLD AUTO: 11.12 K/UL — HIGH (ref 3.8–10.5)
WBC # FLD AUTO: 13.37 K/UL — HIGH (ref 3.8–10.5)

## 2020-03-23 PROCEDURE — 99233 SBSQ HOSP IP/OBS HIGH 50: CPT | Mod: GC

## 2020-03-23 PROCEDURE — 99233 SBSQ HOSP IP/OBS HIGH 50: CPT

## 2020-03-23 RX ORDER — VANCOMYCIN HCL 1 G
1000 VIAL (EA) INTRAVENOUS EVERY 12 HOURS
Refills: 0 | Status: DISCONTINUED | OUTPATIENT
Start: 2020-03-23 | End: 2020-03-24

## 2020-03-23 RX ORDER — PIPERACILLIN AND TAZOBACTAM 4; .5 G/20ML; G/20ML
3.38 INJECTION, POWDER, LYOPHILIZED, FOR SOLUTION INTRAVENOUS EVERY 8 HOURS
Refills: 0 | Status: DISCONTINUED | OUTPATIENT
Start: 2020-03-23 | End: 2020-03-27

## 2020-03-23 RX ORDER — THIAMINE MONONITRATE (VIT B1) 100 MG
200 TABLET ORAL
Refills: 0 | Status: DISCONTINUED | OUTPATIENT
Start: 2020-03-23 | End: 2020-03-27

## 2020-03-23 RX ORDER — ASCORBIC ACID 60 MG
1500 TABLET,CHEWABLE ORAL
Refills: 0 | Status: DISCONTINUED | OUTPATIENT
Start: 2020-03-23 | End: 2020-03-27

## 2020-03-23 RX ORDER — PIPERACILLIN AND TAZOBACTAM 4; .5 G/20ML; G/20ML
3.38 INJECTION, POWDER, LYOPHILIZED, FOR SOLUTION INTRAVENOUS ONCE
Refills: 0 | Status: COMPLETED | OUTPATIENT
Start: 2020-03-23 | End: 2020-03-23

## 2020-03-23 RX ADMIN — Medication 10 MILLILITER(S): at 12:56

## 2020-03-23 RX ADMIN — Medication 1500 MILLIGRAM(S): at 12:56

## 2020-03-23 RX ADMIN — Medication 200 MILLIGRAM(S): at 05:18

## 2020-03-23 RX ADMIN — BUDESONIDE AND FORMOTEROL FUMARATE DIHYDRATE 2 PUFF(S): 160; 4.5 AEROSOL RESPIRATORY (INHALATION) at 18:12

## 2020-03-23 RX ADMIN — PIPERACILLIN AND TAZOBACTAM 25 GRAM(S): 4; .5 INJECTION, POWDER, LYOPHILIZED, FOR SOLUTION INTRAVENOUS at 21:42

## 2020-03-23 RX ADMIN — LEVETIRACETAM 500 MILLIGRAM(S): 250 TABLET, FILM COATED ORAL at 05:18

## 2020-03-23 RX ADMIN — Medication 250 MILLIGRAM(S): at 12:56

## 2020-03-23 RX ADMIN — BUDESONIDE AND FORMOTEROL FUMARATE DIHYDRATE 2 PUFF(S): 160; 4.5 AEROSOL RESPIRATORY (INHALATION) at 05:19

## 2020-03-23 RX ADMIN — Medication 200 MILLIGRAM(S): at 21:42

## 2020-03-23 RX ADMIN — LEVETIRACETAM 500 MILLIGRAM(S): 250 TABLET, FILM COATED ORAL at 18:12

## 2020-03-23 RX ADMIN — Medication 1500 MILLIGRAM(S): at 18:12

## 2020-03-23 RX ADMIN — ALBUTEROL 2 PUFF(S): 90 AEROSOL, METERED ORAL at 09:10

## 2020-03-23 RX ADMIN — Medication 200 MILLIGRAM(S): at 18:12

## 2020-03-23 RX ADMIN — ENOXAPARIN SODIUM 40 MILLIGRAM(S): 100 INJECTION SUBCUTANEOUS at 13:09

## 2020-03-23 RX ADMIN — PIPERACILLIN AND TAZOBACTAM 200 GRAM(S): 4; .5 INJECTION, POWDER, LYOPHILIZED, FOR SOLUTION INTRAVENOUS at 12:55

## 2020-03-23 RX ADMIN — AZITHROMYCIN 250 MILLIGRAM(S): 500 TABLET, FILM COATED ORAL at 12:55

## 2020-03-23 NOTE — PROGRESS NOTE ADULT - ASSESSMENT
49 year old male with asthma on prn albuterol (never hospitalized/ never intubated), meningioma s/p cranioplasty c/b seizures (on keppra 1g BID), presenting with complaints of worsening shortness of breath, found to be COVID-19 positive, now on nrb for o2 support

## 2020-03-23 NOTE — CONSULT NOTE ADULT - ASSESSMENT
49 year old male with asthma on prn albuterol (never hospitalized/ never intubated), meningioma s/p cranioplasty c/b seizures (on keppra 1g BID), presenting with complaints of worsening shortness of breath, found to be COVID-19 positive, MICU consulted for increasing O2 requirements and worsening tachypnea.    #Worsening hypoxic respiratory failure  - Continue to keep pt net negative  - Continue NRB to keep saturation >94%  - Send STAT ABG  - Pt is at high risk of decompensation, continue to monitor on continuous pulse ox while on NRB  #COVID  - Daily CBC w/ diff, Q12 CMP w/ Mg, Phos, Daily ABG w/ lactate  - Every 2-3 days ESR, t-cell, D-dimer, LDH, Ferritin, CKs, Coags, Trop, CRP  - EKG BID while on Chloroquine, keep Mg >3 and K >4  - Vitamin C 1500mg Q6 x 6 days  - Thiamine 200mg Q12 x 6 days  - Chloroquine 500 mg BID x 5 days (If no chloroquine then hydroxycloroquine 400mg BID x 1 day, then 200mg BID x 4 days)    Due to limited ICU beds in current pandemic pt is not a candidate for ICU at this time. Please re-consult with change in status.

## 2020-03-23 NOTE — CONSULT NOTE ADULT - SUBJECTIVE AND OBJECTIVE BOX
HPI: 49 year old male with asthma on prn albuterol (never hospitalized/ never intubated), meningioma s/p cranioplasty c/b seizures (on keppra 1g BID), presenting with complaints of worsening shortness of breath, found to be COVID-19 positive. Today is day 7 of illness for patient and day 5 of admission, MICU was consulted 2/2 increasing O2 requirements and worsening tachypnea. PT originally on 2L NC, progressed to 5L now on NRB to maintain saturations >95. Upon my examination 10L NRB, tachypneic to mid 30s with saturation of 96%, speaking in full sentences states that he is feeling short of breath.    PAST MEDICAL & SURGICAL HISTORY:  Seizure  Meningioma  Asthma  History of appendectomy  H/O hernia repair      FAMILY HISTORY:  FH: CAD (coronary artery disease): Father      Allergies    No Known Allergies    Intolerances        HOME MEDICATIONS:    REVIEW OF SYSTEMS:  Gen: Admits to fever, Denies weight loss  CV: Denies chest pain, palpitations  Skin: Denies rash, erythema, color changes  Resp: Admits to SOB, cough  [x] All other systems negative  [ ] Unable to assess ROS because ________    OBJECTIVE:  ICU Vital Signs Last 24 Hrs  T(C): 37.6 (23 Mar 2020 08:21), Max: 39.3 (22 Mar 2020 16:06)  T(F): 99.6 (23 Mar 2020 08:21), Max: 102.7 (22 Mar 2020 16:06)  HR: 93 (23 Mar 2020 08:21) (83 - 96)  BP: 121/80 (23 Mar 2020 08:21) (121/80 - 134/90)  BP(mean): --  ABP: --  ABP(mean): --  RR: 24 (23 Mar 2020 08:21) (22 - 35)  SpO2: 94% (23 Mar 2020 08:53) (93% - 94%)        03-22 @ 07:01  -  03-23 @ 07:00  --------------------------------------------------------  IN: 680 mL / OUT: 0 mL / NET: 680 mL    03-23 @ 07:01  -  03-23 @ 10:14  --------------------------------------------------------  IN: 0 mL / OUT: 1200 mL / NET: -1200 mL      CAPILLARY BLOOD GLUCOSE          PHYSICAL EXAM: limited to prevent unnecessary physical contact  Gen: WDWN, mild distress  HEENT: EOMI, no nasal discharge, mucous membranes moist  CV: Tachycardia on monitor  Resp: Tachypnea, abdominal accessory muscle use  MSK: No open wounds, no bruising, no LE edema  Neuro: A&Ox4, following commands, moving all four extremities spontaneously    HOSPITAL MEDICATIONS:  MEDICATIONS  (STANDING):  azithromycin  IVPB      azithromycin  IVPB 500 milliGRAM(s) IV Intermittent every 24 hours  budesonide 160 MICROgram(s)/formoterol 4.5 MICROgram(s) Inhaler 2 Puff(s) Inhalation two times a day  enoxaparin Injectable 40 milliGRAM(s) SubCutaneous daily  hydroxychloroquine 200 milliGRAM(s) Oral every 12 hours  levETIRAcetam 500 milliGRAM(s) Oral two times a day    MEDICATIONS  (PRN):  acetaminophen   Tablet .. 650 milliGRAM(s) Oral every 6 hours PRN Temp greater or equal to 38C (100.4F)  ALBUTerol    90 MICROgram(s) HFA Inhaler 2 Puff(s) Inhalation every 4 hours PRN Shortness of Breath and/or Wheezing  guaifenesin/dextromethorphan  Syrup 10 milliLiter(s) Oral every 4 hours PRN Cough      LABS:                        12.8   13.37 )-----------( 211      ( 23 Mar 2020 06:57 )             40.0     03-23    137  |  97  |  14  ----------------------------<  105<H>  3.9   |  25  |  1.05    Ca    9.2      23 Mar 2020 06:57  Phos  3.1     03-23  Mg     2.3     03-23    TPro  7.7  /  Alb  3.7  /  TBili  1.0  /  DBili  x   /  AST  40  /  ALT  38  /  AlkPhos  95  03-23    PT/INR - ( 22 Mar 2020 09:47 )   PT: 15.1 sec;   INR: 1.30 ratio

## 2020-03-23 NOTE — PROGRESS NOTE ADULT - PROBLEM SELECTOR PLAN 3
-see above  -pt has rising white count, rising procalcitonin  - will start empitic vanco, zosyn for suspected HAP, check MRSA swab, if negative will d/c vanco

## 2020-03-23 NOTE — PROGRESS NOTE ADULT - SUBJECTIVE AND OBJECTIVE BOX
Oziel Murdock MD  Division of Hospital Medicine  Pager: [phone]  If no response or off-hours, page [phone]  -------------------------------------    Patient is a 49y old  Male who presents with a chief complaint of Dyspnea, COVID r/o (23 Mar 2020 10:12)      SUBJECTIVE / OVERNIGHT EVENTS: pt placed on nrb overnight  ADDITIONAL REVIEW OF SYSTEMS: appears well, not tachypniec but has low saturations. Feels midlly sob but not overtly symptomatic. No fevers, no cough, no abd complaints.    14 point ros otherwise negative.     MEDICATIONS  (STANDING):  ascorbic acid 1500 milliGRAM(s) Oral four times a day  azithromycin  IVPB      azithromycin  IVPB 500 milliGRAM(s) IV Intermittent every 24 hours  budesonide 160 MICROgram(s)/formoterol 4.5 MICROgram(s) Inhaler 2 Puff(s) Inhalation two times a day  enoxaparin Injectable 40 milliGRAM(s) SubCutaneous daily  hydroxychloroquine 200 milliGRAM(s) Oral every 12 hours  levETIRAcetam 500 milliGRAM(s) Oral two times a day  piperacillin/tazobactam IVPB.. 3.375 Gram(s) IV Intermittent every 8 hours  thiamine 200 milliGRAM(s) Oral <User Schedule>  vancomycin  IVPB 1000 milliGRAM(s) IV Intermittent every 12 hours    MEDICATIONS  (PRN):  acetaminophen   Tablet .. 650 milliGRAM(s) Oral every 6 hours PRN Temp greater or equal to 38C (100.4F)  ALBUTerol    90 MICROgram(s) HFA Inhaler 2 Puff(s) Inhalation every 4 hours PRN Shortness of Breath and/or Wheezing  guaifenesin/dextromethorphan  Syrup 10 milliLiter(s) Oral every 4 hours PRN Cough      CAPILLARY BLOOD GLUCOSE        I&O's Summary    22 Mar 2020 07:01  -  23 Mar 2020 07:00  --------------------------------------------------------  IN: 680 mL / OUT: 0 mL / NET: 680 mL    23 Mar 2020 07:01  -  23 Mar 2020 15:24  --------------------------------------------------------  IN: 0 mL / OUT: 1200 mL / NET: -1200 mL        PHYSICAL EXAM:  Vital Signs Last 24 Hrs  T(C): 37.6 (23 Mar 2020 08:21), Max: 39.3 (22 Mar 2020 16:06)  T(F): 99.6 (23 Mar 2020 08:21), Max: 102.7 (22 Mar 2020 16:06)  HR: 93 (23 Mar 2020 08:21) (91 - 96)  BP: 121/80 (23 Mar 2020 08:21) (121/80 - 134/90)  BP(mean): --  RR: 24 (23 Mar 2020 08:21) (22 - 25)  SpO2: 94% (23 Mar 2020 08:53) (93% - 94%)  CONSTITUTIONAL: NAD, well-developed, well-groomed  EYES: PERRLA; conjunctiva and sclera clear  ENMT: Moist oral mucosa, no pharyngeal injection or exudates; normal dentition  NECK: Supple, no palpable masses; no thyromegaly  RESPIRATORY: +bilateral crackles  CARDIOVASCULAR: Regular rate and rhythm, normal S1 and S2, no murmur/rub/gallop; No lower extremity edema; Peripheral pulses are 2+ bilaterally  ABDOMEN: Nontender to palpation, normoactive bowel sounds, no rebound/guarding; No hepatosplenomegaly  MUSCLOSKELETAL:  Normal gait; no clubbing or cyanosis of digits; no joint swelling or tenderness to palpation  PSYCH: A+O to person, place, and time; affect appropriate  NEUROLOGY: CN 2-12 are intact and symmetric; no gross sensory deficits;   SKIN: No rashes; no palpable lesions    LABS:                        12.8   13.37 )-----------( 211      ( 23 Mar 2020 06:57 )             40.0     03-23    137  |  97  |  14  ----------------------------<  105<H>  3.9   |  25  |  1.05    Ca    9.2      23 Mar 2020 06:57  Phos  3.1     03-23  Mg     2.3     03-23    TPro  7.7  /  Alb  3.7  /  TBili  1.0  /  DBili  x   /  AST  40  /  ALT  38  /  AlkPhos  95  03-23    PT/INR - ( 22 Mar 2020 09:47 )   PT: 15.1 sec;   INR: 1.30 ratio                       COORDINATION OF CARE:  Care Discussed with Consultants/Other Providers [Y/N]: ID  Prior or Outpatient Records Reviewed [Y/N]:

## 2020-03-23 NOTE — PROGRESS NOTE ADULT - PROBLEM SELECTOR PLAN 1
-secondary to COVID 19 infection  -continue with supportive care  -increased oxygenation requirements pt high risk for decompensation given asthma- now on NRB, MICU consulted, pt stable not meeting criteria for intubation at present, will continue monitoring cont pulse ox.  - Continue Hydroxychloroquine 200mg PO BID and Azithromycin 500mg PO daily

## 2020-03-23 NOTE — CONSULT NOTE ADULT - ATTENDING COMMENTS
Patient seen and examined  COVID-19 pneumonia, acute hypoxic resp failure   Persistent fevers  On 100% NRM, feels the same over the last few days, not worse/better  ABG 7.47/35/101  On Plaquenil, azithromycin  Vanc, Zosyn added today   Repeat Cx  Continue current therapy   No need for intubation or ICU care at present, reconsult prn

## 2020-03-24 LAB
ALBUMIN SERPL ELPH-MCNC: 3.1 G/DL — LOW (ref 3.3–5)
ALBUMIN SERPL ELPH-MCNC: 3.3 G/DL — SIGNIFICANT CHANGE UP (ref 3.3–5)
ALP SERPL-CCNC: 108 U/L — SIGNIFICANT CHANGE UP (ref 40–120)
ALP SERPL-CCNC: 140 U/L — HIGH (ref 40–120)
ALT FLD-CCNC: 48 U/L — HIGH (ref 10–45)
ALT FLD-CCNC: 85 U/L — HIGH (ref 10–45)
ANION GAP SERPL CALC-SCNC: 16 MMOL/L — SIGNIFICANT CHANGE UP (ref 5–17)
ANION GAP SERPL CALC-SCNC: 17 MMOL/L — SIGNIFICANT CHANGE UP (ref 5–17)
AST SERPL-CCNC: 50 U/L — HIGH (ref 10–40)
AST SERPL-CCNC: 98 U/L — HIGH (ref 10–40)
BASOPHILS # BLD AUTO: 0.02 K/UL — SIGNIFICANT CHANGE UP (ref 0–0.2)
BASOPHILS NFR BLD AUTO: 0.2 % — SIGNIFICANT CHANGE UP (ref 0–2)
BILIRUB SERPL-MCNC: 1 MG/DL — SIGNIFICANT CHANGE UP (ref 0.2–1.2)
BILIRUB SERPL-MCNC: 1 MG/DL — SIGNIFICANT CHANGE UP (ref 0.2–1.2)
BUN SERPL-MCNC: 13 MG/DL — SIGNIFICANT CHANGE UP (ref 7–23)
BUN SERPL-MCNC: 14 MG/DL — SIGNIFICANT CHANGE UP (ref 7–23)
CALCIUM SERPL-MCNC: 8.8 MG/DL — SIGNIFICANT CHANGE UP (ref 8.4–10.5)
CALCIUM SERPL-MCNC: 8.8 MG/DL — SIGNIFICANT CHANGE UP (ref 8.4–10.5)
CHLORIDE SERPL-SCNC: 96 MMOL/L — SIGNIFICANT CHANGE UP (ref 96–108)
CHLORIDE SERPL-SCNC: 98 MMOL/L — SIGNIFICANT CHANGE UP (ref 96–108)
CO2 SERPL-SCNC: 23 MMOL/L — SIGNIFICANT CHANGE UP (ref 22–31)
CO2 SERPL-SCNC: 24 MMOL/L — SIGNIFICANT CHANGE UP (ref 22–31)
CREAT SERPL-MCNC: 0.92 MG/DL — SIGNIFICANT CHANGE UP (ref 0.5–1.3)
CREAT SERPL-MCNC: 0.94 MG/DL — SIGNIFICANT CHANGE UP (ref 0.5–1.3)
CRP SERPL-MCNC: 28.92 MG/DL — HIGH (ref 0–0.4)
CULTURE RESULTS: SIGNIFICANT CHANGE UP
CULTURE RESULTS: SIGNIFICANT CHANGE UP
EOSINOPHIL # BLD AUTO: 0 K/UL — SIGNIFICANT CHANGE UP (ref 0–0.5)
EOSINOPHIL NFR BLD AUTO: 0 % — SIGNIFICANT CHANGE UP (ref 0–6)
GLUCOSE SERPL-MCNC: 102 MG/DL — HIGH (ref 70–99)
GLUCOSE SERPL-MCNC: 121 MG/DL — HIGH (ref 70–99)
HCT VFR BLD CALC: 36.6 % — LOW (ref 39–50)
HGB BLD-MCNC: 11.9 G/DL — LOW (ref 13–17)
IMM GRANULOCYTES NFR BLD AUTO: 0.9 % — SIGNIFICANT CHANGE UP (ref 0–1.5)
LYMPHOCYTES # BLD AUTO: 1.09 K/UL — SIGNIFICANT CHANGE UP (ref 1–3.3)
LYMPHOCYTES # BLD AUTO: 10.4 % — LOW (ref 13–44)
MAGNESIUM SERPL-MCNC: 2.4 MG/DL — SIGNIFICANT CHANGE UP (ref 1.6–2.6)
MAGNESIUM SERPL-MCNC: 2.6 MG/DL — SIGNIFICANT CHANGE UP (ref 1.6–2.6)
MCHC RBC-ENTMCNC: 29.2 PG — SIGNIFICANT CHANGE UP (ref 27–34)
MCHC RBC-ENTMCNC: 32.5 GM/DL — SIGNIFICANT CHANGE UP (ref 32–36)
MCV RBC AUTO: 89.7 FL — SIGNIFICANT CHANGE UP (ref 80–100)
MONOCYTES # BLD AUTO: 0.6 K/UL — SIGNIFICANT CHANGE UP (ref 0–0.9)
MONOCYTES NFR BLD AUTO: 5.7 % — SIGNIFICANT CHANGE UP (ref 2–14)
NEUTROPHILS # BLD AUTO: 8.64 K/UL — HIGH (ref 1.8–7.4)
NEUTROPHILS NFR BLD AUTO: 82.8 % — HIGH (ref 43–77)
NRBC # BLD: 0 /100 WBCS — SIGNIFICANT CHANGE UP (ref 0–0)
PHOSPHATE SERPL-MCNC: 4.2 MG/DL — SIGNIFICANT CHANGE UP (ref 2.5–4.5)
PHOSPHATE SERPL-MCNC: 4.3 MG/DL — SIGNIFICANT CHANGE UP (ref 2.5–4.5)
PLATELET # BLD AUTO: 243 K/UL — SIGNIFICANT CHANGE UP (ref 150–400)
POTASSIUM SERPL-MCNC: 3.9 MMOL/L — SIGNIFICANT CHANGE UP (ref 3.5–5.3)
POTASSIUM SERPL-MCNC: 4.1 MMOL/L — SIGNIFICANT CHANGE UP (ref 3.5–5.3)
POTASSIUM SERPL-SCNC: 3.9 MMOL/L — SIGNIFICANT CHANGE UP (ref 3.5–5.3)
POTASSIUM SERPL-SCNC: 4.1 MMOL/L — SIGNIFICANT CHANGE UP (ref 3.5–5.3)
PROT SERPL-MCNC: 7.2 G/DL — SIGNIFICANT CHANGE UP (ref 6–8.3)
PROT SERPL-MCNC: 7.4 G/DL — SIGNIFICANT CHANGE UP (ref 6–8.3)
RBC # BLD: 4.08 M/UL — LOW (ref 4.2–5.8)
RBC # FLD: 12.2 % — SIGNIFICANT CHANGE UP (ref 10.3–14.5)
SODIUM SERPL-SCNC: 136 MMOL/L — SIGNIFICANT CHANGE UP (ref 135–145)
SODIUM SERPL-SCNC: 138 MMOL/L — SIGNIFICANT CHANGE UP (ref 135–145)
SPECIMEN SOURCE: SIGNIFICANT CHANGE UP
SPECIMEN SOURCE: SIGNIFICANT CHANGE UP
WBC # BLD: 10.44 K/UL — SIGNIFICANT CHANGE UP (ref 3.8–10.5)
WBC # FLD AUTO: 10.44 K/UL — SIGNIFICANT CHANGE UP (ref 3.8–10.5)

## 2020-03-24 PROCEDURE — 99232 SBSQ HOSP IP/OBS MODERATE 35: CPT

## 2020-03-24 PROCEDURE — 93010 ELECTROCARDIOGRAM REPORT: CPT

## 2020-03-24 RX ADMIN — Medication 1500 MILLIGRAM(S): at 23:31

## 2020-03-24 RX ADMIN — ALBUTEROL 2 PUFF(S): 90 AEROSOL, METERED ORAL at 23:31

## 2020-03-24 RX ADMIN — ENOXAPARIN SODIUM 40 MILLIGRAM(S): 100 INJECTION SUBCUTANEOUS at 13:20

## 2020-03-24 RX ADMIN — LEVETIRACETAM 500 MILLIGRAM(S): 250 TABLET, FILM COATED ORAL at 18:46

## 2020-03-24 RX ADMIN — PIPERACILLIN AND TAZOBACTAM 25 GRAM(S): 4; .5 INJECTION, POWDER, LYOPHILIZED, FOR SOLUTION INTRAVENOUS at 13:19

## 2020-03-24 RX ADMIN — Medication 1500 MILLIGRAM(S): at 18:46

## 2020-03-24 RX ADMIN — Medication 250 MILLIGRAM(S): at 00:32

## 2020-03-24 RX ADMIN — LEVETIRACETAM 500 MILLIGRAM(S): 250 TABLET, FILM COATED ORAL at 05:15

## 2020-03-24 RX ADMIN — Medication 250 MILLIGRAM(S): at 13:19

## 2020-03-24 RX ADMIN — Medication 10 MILLILITER(S): at 20:07

## 2020-03-24 RX ADMIN — PIPERACILLIN AND TAZOBACTAM 25 GRAM(S): 4; .5 INJECTION, POWDER, LYOPHILIZED, FOR SOLUTION INTRAVENOUS at 05:15

## 2020-03-24 RX ADMIN — Medication 200 MILLIGRAM(S): at 05:16

## 2020-03-24 RX ADMIN — Medication 200 MILLIGRAM(S): at 09:00

## 2020-03-24 RX ADMIN — ALBUTEROL 2 PUFF(S): 90 AEROSOL, METERED ORAL at 18:47

## 2020-03-24 RX ADMIN — Medication 1500 MILLIGRAM(S): at 05:16

## 2020-03-24 RX ADMIN — Medication 200 MILLIGRAM(S): at 18:46

## 2020-03-24 RX ADMIN — Medication 200 MILLIGRAM(S): at 21:24

## 2020-03-24 RX ADMIN — BUDESONIDE AND FORMOTEROL FUMARATE DIHYDRATE 2 PUFF(S): 160; 4.5 AEROSOL RESPIRATORY (INHALATION) at 18:47

## 2020-03-24 RX ADMIN — PIPERACILLIN AND TAZOBACTAM 25 GRAM(S): 4; .5 INJECTION, POWDER, LYOPHILIZED, FOR SOLUTION INTRAVENOUS at 21:24

## 2020-03-24 RX ADMIN — AZITHROMYCIN 250 MILLIGRAM(S): 500 TABLET, FILM COATED ORAL at 13:19

## 2020-03-24 RX ADMIN — BUDESONIDE AND FORMOTEROL FUMARATE DIHYDRATE 2 PUFF(S): 160; 4.5 AEROSOL RESPIRATORY (INHALATION) at 05:16

## 2020-03-24 RX ADMIN — Medication 1500 MILLIGRAM(S): at 00:32

## 2020-03-24 RX ADMIN — Medication 1500 MILLIGRAM(S): at 13:20

## 2020-03-24 NOTE — PROGRESS NOTE ADULT - ASSESSMENT
49 year old male with asthma on prn albuterol (never hospitalized/ never intubated), meningioma s/p cranioplasty c/b seizures (on keppra 1g BID), presenting with complaints of worsening shortness of breath, found to be COVID-19 positive, now on nrb for o2 support but with O2 requirements now modestly decreasing

## 2020-03-24 NOTE — PROGRESS NOTE ADULT - PROBLEM/PLAN-5
DISPLAY PLAN FREE TEXT [Mother] : mother [Cow's milk (Ounces per day ___)] : consumes [unfilled] oz of cow's milk per day [Fruit] : fruit [Vegetables] : vegetables [Meat] : meat [Eggs] : eggs [Finger Foods] : finger foods [Table food] : table food [Normal] : Normal [Pacifier use] : Pacifier use [Sippy cup use] : Sippy cup use [Brushing teeth] : Brushing teeth [Playtime] : Playtime [Temper Tantrums] : Temper tantrums [Water heater temperature set at <120 degrees F] : Water heater temperature set at <120 degrees F [Car seat in back seat] : Car seat in back seat [Carbon Monoxide Detectors] : Carbon monoxide detectors [Smoke Detectors] : Smoke detectors [Cigarette smoke exposure] : No cigarette smoke exposure [Exposure to electronic nicotine delivery system] : No exposure to electronic nicotine delivery system [Up to date] : Up to date [FreeTextEntry1] : Patient was seen today for her physical. Patient has not been well for the past week. She had a temperature last week for one day. She then developed cough and congestion. She has been very irritable in the past few days. She has had a slightly thicker nasal discharge. Patient has been on no medications. She is doing otherwise well developmentally. Mom has no concerns. She just started walking about one month ago.

## 2020-03-24 NOTE — DIETITIAN INITIAL EVALUATION ADULT. - PHYSICAL APPEARANCE
n/a/other (specify) Height: 65 inches, Weight: 125 pounds  BMI: 20.8 kg/m2 IBW: 136 pounds (+/-10%), %IBW: 92%  Pertinent Info: No edema noted, no pressure injuries noted at this time in nursing flow sheet.

## 2020-03-24 NOTE — DIETITIAN INITIAL EVALUATION ADULT. - PROBLEM SELECTOR PLAN 5
Does not appear to be in exacerbation, however prone to exacerbation given possible PNA vs viral illness  -c/w albuterol inhaler PRN (no duonebs)

## 2020-03-24 NOTE — PROGRESS NOTE ADULT - SUBJECTIVE AND OBJECTIVE BOX
Oziel Murdock MD  Division of Hospital Medicine  Pager: [phone]  If no response or off-hours, page [phone]  -------------------------------------    Patient is a 49y old  Male who presents with a chief complaint of Dyspnea, COVID r/o (23 Mar 2020 15:24)      SUBJECTIVE / OVERNIGHT EVENTS: none acute  ADDITIONAL REVIEW OF SYSTEMS: pt reports feeling slightly better- still gets winded with smallest of movements, but overall slightly improved. 14 point ros otherwise negative.     MEDICATIONS  (STANDING):  ascorbic acid 1500 milliGRAM(s) Oral four times a day  azithromycin  IVPB      azithromycin  IVPB 500 milliGRAM(s) IV Intermittent every 24 hours  budesonide 160 MICROgram(s)/formoterol 4.5 MICROgram(s) Inhaler 2 Puff(s) Inhalation two times a day  enoxaparin Injectable 40 milliGRAM(s) SubCutaneous daily  hydroxychloroquine 200 milliGRAM(s) Oral every 12 hours  levETIRAcetam 500 milliGRAM(s) Oral two times a day  piperacillin/tazobactam IVPB.. 3.375 Gram(s) IV Intermittent every 8 hours  thiamine 200 milliGRAM(s) Oral <User Schedule>  vancomycin  IVPB 1000 milliGRAM(s) IV Intermittent every 12 hours    MEDICATIONS  (PRN):  acetaminophen   Tablet .. 650 milliGRAM(s) Oral every 6 hours PRN Temp greater or equal to 38C (100.4F)  ALBUTerol    90 MICROgram(s) HFA Inhaler 2 Puff(s) Inhalation every 4 hours PRN Shortness of Breath and/or Wheezing  guaifenesin/dextromethorphan  Syrup 10 milliLiter(s) Oral every 4 hours PRN Cough      CAPILLARY BLOOD GLUCOSE        I&O's Summary    23 Mar 2020 07:01  -  24 Mar 2020 07:00  --------------------------------------------------------  IN: 920 mL / OUT: 2100 mL / NET: -1180 mL        PHYSICAL EXAM:  Vital Signs Last 24 Hrs  T(C): 37.2 (24 Mar 2020 08:05), Max: 38.1 (23 Mar 2020 16:00)  T(F): 99 (24 Mar 2020 08:05), Max: 100.6 (23 Mar 2020 16:00)  HR: 83 (24 Mar 2020 08:05) (78 - 94)  BP: 116/73 (24 Mar 2020 08:05) (105/69 - 130/87)  BP(mean): --  RR: 18 (24 Mar 2020 08:05) (18 - 25)  SpO2: 98% (24 Mar 2020 08:05) (94% - 98%)  CONSTITUTIONAL: NAD, well-developed, well-groomed  EYES: PERRLA; conjunctiva and sclera clear  ENMT: Moist oral mucosa, no pharyngeal injection or exudates; normal dentition  NECK: Supple, no palpable masses; no thyromegaly  RESPIRATORY: +tachypneic  CARDIOVASCULAR: Regular rate and rhythm, normal S1 and S2, no murmur/rub/gallop; No lower extremity edema; Peripheral pulses are 2+ bilaterally  ABDOMEN: Nontender to palpation, normoactive bowel sounds, no rebound/guarding; No hepatosplenomegaly  MUSCLOSKELETAL:  Normal gait; no clubbing or cyanosis of digits; no joint swelling or tenderness to palpation  PSYCH: A+O to person, place, and time; affect appropriate  NEUROLOGY: CN 2-12 are intact and symmetric; no gross sensory deficits;   SKIN: No rashes; no palpable lesions    LABS:                        11.9   10.44 )-----------( 243      ( 24 Mar 2020 06:46 )             36.6     03-24    136  |  96  |  14  ----------------------------<  121<H>  3.9   |  24  |  0.94    Ca    8.8      24 Mar 2020 06:37  Phos  4.3     03-24  Mg     2.4     03-24    TPro  7.4  /  Alb  3.3  /  TBili  1.0  /  DBili  x   /  AST  50<H>  /  ALT  48<H>  /  AlkPhos  108  03-24      CARDIAC MARKERS ( 23 Mar 2020 18:26 )  x     / x     / 446 U/L / x     / x            COORDINATION OF CARE:  Care Discussed with Consultants/Other Providers [Y/N]: DARY Landry  Prior or Outpatient Records Reviewed [Y/N]:

## 2020-03-24 NOTE — PROGRESS NOTE ADULT - PROBLEM SELECTOR PLAN 1
-secondary to COVID 19 infection  -continue with supportive care  -increased oxygenation requirements pt high risk for decompensation given asthma- now on NRB, titrated from 15L to 10 L today  - Continue Hydroxychloroquine 200mg PO BID and Azithromycin 500mg PO daily

## 2020-03-24 NOTE — DIETITIAN INITIAL EVALUATION ADULT. - PROBLEM SELECTOR PLAN 3
Pt with sepsis (febrile, tachypneic, HR >90) with suspected lung infection. PNA vs viral syndrome (r/o COVID). CXR with Hazy opacity in the left lower lung  -s/p ceftriaxone and azithro in the ED, continue   -f/u bcx, COVID-19  -f/u CT chest

## 2020-03-24 NOTE — DIETITIAN INITIAL EVALUATION ADULT. - REASON INDICATOR FOR ASSESSMENT
Initial RD assessment. Unable to conduct face-to-face interview secondary to restricted isolation precautions with pt medical condition. RD interview attempted via phone at this time, however unable to reach pt.  Information obtained from: electronic medical record

## 2020-03-24 NOTE — DIETITIAN INITIAL EVALUATION ADULT. - OTHER INFO
Per chart, pt is "49 year old male with asthma on prn albuterol (never hospitalized/ never intubated), meningioma s/p cranioplasty c/b seizures (on keppra 1g BID), presenting with complaints of worsening shortness of breath, found to be COVID-19 positive, now on nrb for o2 support but with O2 requirements now modestly decreasing."    Limited subjective information regarding therapeutic diet PTA, PO intake PTA, and weight history available at this time.  NKFA noted in chart. No micronutrient supplementation PTA per H&P.  Pt dosing wt noted as 125 lbs.    Unable to assess PO intake on current admission at this time.  No chewing/swallowing difficulties noted in chart.  No c/o nausea, vomiting, diarrhea, or constipation noted in chart. Unclear when pt last BM was.

## 2020-03-24 NOTE — DIETITIAN INITIAL EVALUATION ADULT. - ADD RECOMMEND
1. Continue to provide current diet order, no therapeutic diet restrictions indicated at this time. Kosher preference noted at this time. Monitor for adequate po intake, consider oral nutrition supplementation if pt demonstrates decreased PO intake. 2. Will continue to monitor nutrient intake, wt, labs, f/u per protocol

## 2020-03-24 NOTE — PROGRESS NOTE ADULT - PROBLEM SELECTOR PLAN 3
-see above  -pt has rising white count, rising procalcitonin and consolidative infiltrate on CT chest concerning for concomitant bacterial PNA  - will dc empiric vanc as mrsa swab negative  - on empiric zosyn, cont azithro, may consider switching to augmentin to complete 7 day course

## 2020-03-24 NOTE — DIETITIAN INITIAL EVALUATION ADULT. - PROBLEM SELECTOR PLAN 1
hypoxia w/ left lung opacification c/w pneumonia still requires eval for viral vs bacterial  -initially had dyspnea and when traveled from ED to floor found to have hypoxia now requiring 2LNC. discussed with RN that if hypoxia worsens to notify covering team  - concern for viral infection. COVID19 PCR collected. pending CT chest to better characterize  - c/w ceftriaxone and azithromycin

## 2020-03-24 NOTE — DIETITIAN INITIAL EVALUATION ADULT. - PERTINENT MEDS FT
MEDICATIONS  (STANDING):  ascorbic acid 1500 milliGRAM(s) Oral four times a day  azithromycin  IVPB      azithromycin  IVPB 500 milliGRAM(s) IV Intermittent every 24 hours  budesonide 160 MICROgram(s)/formoterol 4.5 MICROgram(s) Inhaler 2 Puff(s) Inhalation two times a day  enoxaparin Injectable 40 milliGRAM(s) SubCutaneous daily  hydroxychloroquine 200 milliGRAM(s) Oral every 12 hours  levETIRAcetam 500 milliGRAM(s) Oral two times a day  piperacillin/tazobactam IVPB.. 3.375 Gram(s) IV Intermittent every 8 hours  thiamine 200 milliGRAM(s) Oral <User Schedule>    MEDICATIONS  (PRN):  acetaminophen   Tablet .. 650 milliGRAM(s) Oral every 6 hours PRN Temp greater or equal to 38C (100.4F)  ALBUTerol    90 MICROgram(s) HFA Inhaler 2 Puff(s) Inhalation every 4 hours PRN Shortness of Breath and/or Wheezing  guaifenesin/dextromethorphan  Syrup 10 milliLiter(s) Oral every 4 hours PRN Cough

## 2020-03-25 LAB
ALBUMIN SERPL ELPH-MCNC: 3.4 G/DL — SIGNIFICANT CHANGE UP (ref 3.3–5)
ALBUMIN SERPL ELPH-MCNC: 3.5 G/DL — SIGNIFICANT CHANGE UP (ref 3.3–5)
ALP SERPL-CCNC: 143 U/L — HIGH (ref 40–120)
ALP SERPL-CCNC: 157 U/L — HIGH (ref 40–120)
ALT FLD-CCNC: 115 U/L — HIGH (ref 10–45)
ALT FLD-CCNC: 96 U/L — HIGH (ref 10–45)
ANION GAP SERPL CALC-SCNC: 15 MMOL/L — SIGNIFICANT CHANGE UP (ref 5–17)
ANION GAP SERPL CALC-SCNC: 17 MMOL/L — SIGNIFICANT CHANGE UP (ref 5–17)
APTT BLD: 32.7 SEC — SIGNIFICANT CHANGE UP (ref 27.5–36.3)
AST SERPL-CCNC: 88 U/L — HIGH (ref 10–40)
AST SERPL-CCNC: 92 U/L — HIGH (ref 10–40)
BILIRUB SERPL-MCNC: 0.7 MG/DL — SIGNIFICANT CHANGE UP (ref 0.2–1.2)
BILIRUB SERPL-MCNC: 0.9 MG/DL — SIGNIFICANT CHANGE UP (ref 0.2–1.2)
BUN SERPL-MCNC: 13 MG/DL — SIGNIFICANT CHANGE UP (ref 7–23)
BUN SERPL-MCNC: 15 MG/DL — SIGNIFICANT CHANGE UP (ref 7–23)
CALCIUM SERPL-MCNC: 9.2 MG/DL — SIGNIFICANT CHANGE UP (ref 8.4–10.5)
CALCIUM SERPL-MCNC: 9.4 MG/DL — SIGNIFICANT CHANGE UP (ref 8.4–10.5)
CHLORIDE SERPL-SCNC: 101 MMOL/L — SIGNIFICANT CHANGE UP (ref 96–108)
CHLORIDE SERPL-SCNC: 98 MMOL/L — SIGNIFICANT CHANGE UP (ref 96–108)
CK SERPL-CCNC: 279 U/L — HIGH (ref 30–200)
CO2 SERPL-SCNC: 24 MMOL/L — SIGNIFICANT CHANGE UP (ref 22–31)
CO2 SERPL-SCNC: 24 MMOL/L — SIGNIFICANT CHANGE UP (ref 22–31)
CREAT SERPL-MCNC: 0.9 MG/DL — SIGNIFICANT CHANGE UP (ref 0.5–1.3)
CREAT SERPL-MCNC: 1 MG/DL — SIGNIFICANT CHANGE UP (ref 0.5–1.3)
CRP SERPL-MCNC: 27.57 MG/DL — HIGH (ref 0–0.4)
ERYTHROCYTE [SEDIMENTATION RATE] IN BLOOD: 98 MM/HR — HIGH (ref 0–20)
FERRITIN SERPL-MCNC: 1226 NG/ML — HIGH (ref 30–400)
GLUCOSE SERPL-MCNC: 102 MG/DL — HIGH (ref 70–99)
GLUCOSE SERPL-MCNC: 148 MG/DL — HIGH (ref 70–99)
HCT VFR BLD CALC: 36.8 % — LOW (ref 39–50)
HGB BLD-MCNC: 11.9 G/DL — LOW (ref 13–17)
LDH SERPL L TO P-CCNC: 371 U/L — HIGH (ref 50–242)
MAGNESIUM SERPL-MCNC: 2.7 MG/DL — HIGH (ref 1.6–2.6)
MAGNESIUM SERPL-MCNC: 2.8 MG/DL — HIGH (ref 1.6–2.6)
MCHC RBC-ENTMCNC: 29.1 PG — SIGNIFICANT CHANGE UP (ref 27–34)
MCHC RBC-ENTMCNC: 32.3 GM/DL — SIGNIFICANT CHANGE UP (ref 32–36)
MCV RBC AUTO: 90 FL — SIGNIFICANT CHANGE UP (ref 80–100)
NRBC # BLD: 0 /100 WBCS — SIGNIFICANT CHANGE UP (ref 0–0)
PHOSPHATE SERPL-MCNC: 3.5 MG/DL — SIGNIFICANT CHANGE UP (ref 2.5–4.5)
PHOSPHATE SERPL-MCNC: 4.4 MG/DL — SIGNIFICANT CHANGE UP (ref 2.5–4.5)
PLATELET # BLD AUTO: 301 K/UL — SIGNIFICANT CHANGE UP (ref 150–400)
POTASSIUM SERPL-MCNC: 3.6 MMOL/L — SIGNIFICANT CHANGE UP (ref 3.5–5.3)
POTASSIUM SERPL-MCNC: 3.9 MMOL/L — SIGNIFICANT CHANGE UP (ref 3.5–5.3)
POTASSIUM SERPL-SCNC: 3.6 MMOL/L — SIGNIFICANT CHANGE UP (ref 3.5–5.3)
POTASSIUM SERPL-SCNC: 3.9 MMOL/L — SIGNIFICANT CHANGE UP (ref 3.5–5.3)
PROT SERPL-MCNC: 7.6 G/DL — SIGNIFICANT CHANGE UP (ref 6–8.3)
PROT SERPL-MCNC: 7.9 G/DL — SIGNIFICANT CHANGE UP (ref 6–8.3)
RBC # BLD: 4.09 M/UL — LOW (ref 4.2–5.8)
RBC # FLD: 12.2 % — SIGNIFICANT CHANGE UP (ref 10.3–14.5)
SODIUM SERPL-SCNC: 139 MMOL/L — SIGNIFICANT CHANGE UP (ref 135–145)
SODIUM SERPL-SCNC: 140 MMOL/L — SIGNIFICANT CHANGE UP (ref 135–145)
TROPONIN T, HIGH SENSITIVITY RESULT: 12 NG/L — SIGNIFICANT CHANGE UP (ref 0–51)
WBC # BLD: 9.46 K/UL — SIGNIFICANT CHANGE UP (ref 3.8–10.5)
WBC # FLD AUTO: 9.46 K/UL — SIGNIFICANT CHANGE UP (ref 3.8–10.5)

## 2020-03-25 PROCEDURE — 99232 SBSQ HOSP IP/OBS MODERATE 35: CPT

## 2020-03-25 PROCEDURE — 93010 ELECTROCARDIOGRAM REPORT: CPT

## 2020-03-25 RX ADMIN — LEVETIRACETAM 500 MILLIGRAM(S): 250 TABLET, FILM COATED ORAL at 05:41

## 2020-03-25 RX ADMIN — Medication 10 MILLILITER(S): at 22:10

## 2020-03-25 RX ADMIN — Medication 1500 MILLIGRAM(S): at 13:34

## 2020-03-25 RX ADMIN — PIPERACILLIN AND TAZOBACTAM 25 GRAM(S): 4; .5 INJECTION, POWDER, LYOPHILIZED, FOR SOLUTION INTRAVENOUS at 05:40

## 2020-03-25 RX ADMIN — Medication 1500 MILLIGRAM(S): at 23:06

## 2020-03-25 RX ADMIN — Medication 200 MILLIGRAM(S): at 08:04

## 2020-03-25 RX ADMIN — PIPERACILLIN AND TAZOBACTAM 25 GRAM(S): 4; .5 INJECTION, POWDER, LYOPHILIZED, FOR SOLUTION INTRAVENOUS at 13:34

## 2020-03-25 RX ADMIN — PIPERACILLIN AND TAZOBACTAM 25 GRAM(S): 4; .5 INJECTION, POWDER, LYOPHILIZED, FOR SOLUTION INTRAVENOUS at 22:10

## 2020-03-25 RX ADMIN — AZITHROMYCIN 250 MILLIGRAM(S): 500 TABLET, FILM COATED ORAL at 13:33

## 2020-03-25 RX ADMIN — LEVETIRACETAM 500 MILLIGRAM(S): 250 TABLET, FILM COATED ORAL at 18:05

## 2020-03-25 RX ADMIN — ALBUTEROL 2 PUFF(S): 90 AEROSOL, METERED ORAL at 05:41

## 2020-03-25 RX ADMIN — Medication 1500 MILLIGRAM(S): at 05:41

## 2020-03-25 RX ADMIN — BUDESONIDE AND FORMOTEROL FUMARATE DIHYDRATE 2 PUFF(S): 160; 4.5 AEROSOL RESPIRATORY (INHALATION) at 18:06

## 2020-03-25 RX ADMIN — BUDESONIDE AND FORMOTEROL FUMARATE DIHYDRATE 2 PUFF(S): 160; 4.5 AEROSOL RESPIRATORY (INHALATION) at 05:40

## 2020-03-25 RX ADMIN — Medication 200 MILLIGRAM(S): at 20:35

## 2020-03-25 RX ADMIN — Medication 1500 MILLIGRAM(S): at 18:06

## 2020-03-25 RX ADMIN — ENOXAPARIN SODIUM 40 MILLIGRAM(S): 100 INJECTION SUBCUTANEOUS at 13:34

## 2020-03-25 NOTE — PROGRESS NOTE ADULT - PROBLEM SELECTOR PLAN 3
-see above  -pt had rising white count, rising procalcitonin and consolidative infiltrate on CT chest concerning for concomitant bacterial PNA  - will dc empiric vanc as mrsa swab negative  - on empiric zosyn, cont azithro, may consider switching to augmentin to complete 7 day course upon discharge

## 2020-03-25 NOTE — PROGRESS NOTE ADULT - PROBLEM SELECTOR PLAN 1
-secondary to COVID 19 infection and likely concomitant bacterial PNA  -continue with supportive care  - decreasing o2 requirements- on 5LNC today  - s/p plaquenil, continue azithro for now

## 2020-03-25 NOTE — PROVIDER CONTACT NOTE (OTHER) - ACTION/TREATMENT ORDERED:
Provider notified and aware. Ordered to give patient hot packs and additional blankets. Recheck temperature. Continue to monitor.

## 2020-03-25 NOTE — PROGRESS NOTE ADULT - PROBLEM SELECTOR PLAN 7
Transitions of Care Status:  1.  Name of PCP:  2.  PCP Contacted on Admission: [ ] Y    [ ] N    3.  PCP contacted at Discharge: [ ] Y    [ ] N    [ ] N/A  4.  Post-Discharge Appointment Date and Location:  5.  Summary of Handoff given to PCP:    dispo: hopefully in the next 2-3 days if able to come off O2. discussed with wife Vivienne.

## 2020-03-25 NOTE — PROGRESS NOTE ADULT - SUBJECTIVE AND OBJECTIVE BOX
Oziel Murdock MD  Division of Hospital Medicine  Pager: [phone]  If no response or off-hours, page [phone]  -------------------------------------    Patient is a 49y old  Male who presents with a chief complaint of Dyspnea, COVID r/o (24 Mar 2020 13:36)      SUBJECTIVE / OVERNIGHT EVENTS: None acute  ADDITIONAL REVIEW OF SYSTEMS: Pt reports sob mildly improved, still gets winded when going bathroom. Otherwise feels slightly better, does not want to wear the face mask.    14 point ros otherwise negative.     MEDICATIONS  (STANDING):  ascorbic acid 1500 milliGRAM(s) Oral four times a day  azithromycin  IVPB      azithromycin  IVPB 500 milliGRAM(s) IV Intermittent every 24 hours  budesonide 160 MICROgram(s)/formoterol 4.5 MICROgram(s) Inhaler 2 Puff(s) Inhalation two times a day  enoxaparin Injectable 40 milliGRAM(s) SubCutaneous daily  levETIRAcetam 500 milliGRAM(s) Oral two times a day  piperacillin/tazobactam IVPB.. 3.375 Gram(s) IV Intermittent every 8 hours  thiamine 200 milliGRAM(s) Oral <User Schedule>    MEDICATIONS  (PRN):  acetaminophen   Tablet .. 650 milliGRAM(s) Oral every 6 hours PRN Temp greater or equal to 38C (100.4F)  ALBUTerol    90 MICROgram(s) HFA Inhaler 2 Puff(s) Inhalation every 4 hours PRN Shortness of Breath and/or Wheezing  guaifenesin/dextromethorphan  Syrup 10 milliLiter(s) Oral every 4 hours PRN Cough      CAPILLARY BLOOD GLUCOSE        I&O's Summary    24 Mar 2020 07:01  -  25 Mar 2020 07:00  --------------------------------------------------------  IN: 690 mL / OUT: 950 mL / NET: -260 mL        PHYSICAL EXAM:  Vital Signs Last 24 Hrs  T(C): 36.5 (25 Mar 2020 12:37), Max: 37.5 (24 Mar 2020 20:03)  T(F): 97.7 (25 Mar 2020 12:37), Max: 99.5 (24 Mar 2020 20:03)  HR: 64 (25 Mar 2020 12:37) (64 - 80)  BP: 107/71 (25 Mar 2020 12:37) (107/71 - 130/84)  BP(mean): --  RR: 18 (25 Mar 2020 12:37) (18 - 29)  SpO2: 95% (25 Mar 2020 12:37) (95% - 99%)  CONSTITUTIONAL: NAD, well-developed, well-groomed  EYES: PERRLA; conjunctiva and sclera clear  ENMT: Moist oral mucosa, no pharyngeal injection or exudates; normal dentition  NECK: Supple, no palpable masses; no thyromegaly  RESPIRATORY: +tachypneic, saturating 93-95% on 5L NC, no accessory muscle usage.    CARDIOVASCULAR: Regular rate and rhythm, normal S1 and S2, no murmur/rub/gallop; No lower extremity edema; Peripheral pulses are 2+ bilaterally  ABDOMEN: Nontender to palpation, normoactive bowel sounds, no rebound/guarding; No hepatosplenomegaly  MUSCLOSKELETAL:  Normal gait; no clubbing or cyanosis of digits; no joint swelling or tenderness to palpation  PSYCH: A+O to person, place, and time; affect appropriate  NEUROLOGY: CN 2-12 are intact and symmetric; no gross sensory deficits;   SKIN: No rashes; no palpable lesions    LABS:                        11.9   9.46  )-----------( 301      ( 25 Mar 2020 07:32 )             36.8     03-25    139  |  98  |  15  ----------------------------<  102<H>  3.9   |  24  |  1.00    Ca    9.4      25 Mar 2020 07:26  Phos  4.4     03-25  Mg     2.8     03-25    TPro  7.6  /  Alb  3.5  /  TBili  0.9  /  DBili  x   /  AST  92<H>  /  ALT  96<H>  /  AlkPhos  143<H>  03-25      CARDIAC MARKERS ( 25 Mar 2020 07:28 )  x     / x     / 279 U/L / x     / x      CARDIAC MARKERS ( 23 Mar 2020 18:26 )  x     / x     / 446 U/L / x     / x              Culture - Blood (collected 23 Mar 2020 17:07)  Source: .Blood Blood  Preliminary Report (24 Mar 2020 18:02):    No growth to date.    Culture - Blood (collected 23 Mar 2020 17:07)  Source: .Blood Blood  Preliminary Report (24 Mar 2020 18:02):    No growth to date.        RADIOLOGY & ADDITIONAL TESTS:  Results Reviewed: labs    case d/w ID

## 2020-03-25 NOTE — PROVIDER CONTACT NOTE (OTHER) - ASSESSMENT
Pt a&Ox4, denies pain or discomfort at this time. patient sitting calmly at bedside at this time. T 96.7, HR 78, /68, RR 18, patient o2 saturation is 98% on 15L o2 via nonrebreather mask.

## 2020-03-26 LAB
ALBUMIN SERPL ELPH-MCNC: 3.3 G/DL — SIGNIFICANT CHANGE UP (ref 3.3–5)
ALBUMIN SERPL ELPH-MCNC: 3.5 G/DL — SIGNIFICANT CHANGE UP (ref 3.3–5)
ALP SERPL-CCNC: 152 U/L — HIGH (ref 40–120)
ALP SERPL-CCNC: 154 U/L — HIGH (ref 40–120)
ALT FLD-CCNC: 118 U/L — HIGH (ref 10–45)
ALT FLD-CCNC: 123 U/L — HIGH (ref 10–45)
ANION GAP SERPL CALC-SCNC: 14 MMOL/L — SIGNIFICANT CHANGE UP (ref 5–17)
ANION GAP SERPL CALC-SCNC: 17 MMOL/L — SIGNIFICANT CHANGE UP (ref 5–17)
AST SERPL-CCNC: 72 U/L — HIGH (ref 10–40)
AST SERPL-CCNC: 81 U/L — HIGH (ref 10–40)
BASOPHILS # BLD AUTO: 0.03 K/UL — SIGNIFICANT CHANGE UP (ref 0–0.2)
BASOPHILS NFR BLD AUTO: 0.4 % — SIGNIFICANT CHANGE UP (ref 0–2)
BILIRUB SERPL-MCNC: 0.7 MG/DL — SIGNIFICANT CHANGE UP (ref 0.2–1.2)
BILIRUB SERPL-MCNC: 0.7 MG/DL — SIGNIFICANT CHANGE UP (ref 0.2–1.2)
BUN SERPL-MCNC: 11 MG/DL — SIGNIFICANT CHANGE UP (ref 7–23)
BUN SERPL-MCNC: 13 MG/DL — SIGNIFICANT CHANGE UP (ref 7–23)
CALCIUM SERPL-MCNC: 9.1 MG/DL — SIGNIFICANT CHANGE UP (ref 8.4–10.5)
CALCIUM SERPL-MCNC: 9.1 MG/DL — SIGNIFICANT CHANGE UP (ref 8.4–10.5)
CHLORIDE SERPL-SCNC: 101 MMOL/L — SIGNIFICANT CHANGE UP (ref 96–108)
CHLORIDE SERPL-SCNC: 99 MMOL/L — SIGNIFICANT CHANGE UP (ref 96–108)
CO2 SERPL-SCNC: 24 MMOL/L — SIGNIFICANT CHANGE UP (ref 22–31)
CO2 SERPL-SCNC: 25 MMOL/L — SIGNIFICANT CHANGE UP (ref 22–31)
CREAT SERPL-MCNC: 0.89 MG/DL — SIGNIFICANT CHANGE UP (ref 0.5–1.3)
CREAT SERPL-MCNC: 0.96 MG/DL — SIGNIFICANT CHANGE UP (ref 0.5–1.3)
EOSINOPHIL # BLD AUTO: 0.09 K/UL — SIGNIFICANT CHANGE UP (ref 0–0.5)
EOSINOPHIL NFR BLD AUTO: 1.2 % — SIGNIFICANT CHANGE UP (ref 0–6)
GLUCOSE SERPL-MCNC: 102 MG/DL — HIGH (ref 70–99)
GLUCOSE SERPL-MCNC: 112 MG/DL — HIGH (ref 70–99)
HCT VFR BLD CALC: 35.2 % — LOW (ref 39–50)
HGB BLD-MCNC: 11.6 G/DL — LOW (ref 13–17)
IMM GRANULOCYTES NFR BLD AUTO: 1.7 % — HIGH (ref 0–1.5)
INR BLD: 1.19 RATIO — HIGH (ref 0.88–1.16)
LYMPHOCYTES # BLD AUTO: 1.18 K/UL — SIGNIFICANT CHANGE UP (ref 1–3.3)
LYMPHOCYTES # BLD AUTO: 15.3 % — SIGNIFICANT CHANGE UP (ref 13–44)
MAGNESIUM SERPL-MCNC: 2.7 MG/DL — HIGH (ref 1.6–2.6)
MAGNESIUM SERPL-MCNC: 2.7 MG/DL — HIGH (ref 1.6–2.6)
MCHC RBC-ENTMCNC: 29.7 PG — SIGNIFICANT CHANGE UP (ref 27–34)
MCHC RBC-ENTMCNC: 33 GM/DL — SIGNIFICANT CHANGE UP (ref 32–36)
MCV RBC AUTO: 90.3 FL — SIGNIFICANT CHANGE UP (ref 80–100)
MONOCYTES # BLD AUTO: 0.71 K/UL — SIGNIFICANT CHANGE UP (ref 0–0.9)
MONOCYTES NFR BLD AUTO: 9.2 % — SIGNIFICANT CHANGE UP (ref 2–14)
NEUTROPHILS # BLD AUTO: 5.59 K/UL — SIGNIFICANT CHANGE UP (ref 1.8–7.4)
NEUTROPHILS NFR BLD AUTO: 72.2 % — SIGNIFICANT CHANGE UP (ref 43–77)
NRBC # BLD: 0 /100 WBCS — SIGNIFICANT CHANGE UP (ref 0–0)
PHOSPHATE SERPL-MCNC: 3.3 MG/DL — SIGNIFICANT CHANGE UP (ref 2.5–4.5)
PHOSPHATE SERPL-MCNC: 4 MG/DL — SIGNIFICANT CHANGE UP (ref 2.5–4.5)
PLATELET # BLD AUTO: 323 K/UL — SIGNIFICANT CHANGE UP (ref 150–400)
POTASSIUM SERPL-MCNC: 3.5 MMOL/L — SIGNIFICANT CHANGE UP (ref 3.5–5.3)
POTASSIUM SERPL-MCNC: 3.9 MMOL/L — SIGNIFICANT CHANGE UP (ref 3.5–5.3)
POTASSIUM SERPL-SCNC: 3.5 MMOL/L — SIGNIFICANT CHANGE UP (ref 3.5–5.3)
POTASSIUM SERPL-SCNC: 3.9 MMOL/L — SIGNIFICANT CHANGE UP (ref 3.5–5.3)
PROT SERPL-MCNC: 7.4 G/DL — SIGNIFICANT CHANGE UP (ref 6–8.3)
PROT SERPL-MCNC: 7.5 G/DL — SIGNIFICANT CHANGE UP (ref 6–8.3)
PROTHROM AB SERPL-ACNC: 13.8 SEC — HIGH (ref 10–12.9)
RBC # BLD: 3.9 M/UL — LOW (ref 4.2–5.8)
RBC # FLD: 12.2 % — SIGNIFICANT CHANGE UP (ref 10.3–14.5)
SODIUM SERPL-SCNC: 139 MMOL/L — SIGNIFICANT CHANGE UP (ref 135–145)
SODIUM SERPL-SCNC: 141 MMOL/L — SIGNIFICANT CHANGE UP (ref 135–145)
WBC # BLD: 7.73 K/UL — SIGNIFICANT CHANGE UP (ref 3.8–10.5)
WBC # FLD AUTO: 7.73 K/UL — SIGNIFICANT CHANGE UP (ref 3.8–10.5)

## 2020-03-26 PROCEDURE — 99232 SBSQ HOSP IP/OBS MODERATE 35: CPT

## 2020-03-26 RX ORDER — POTASSIUM CHLORIDE 20 MEQ
40 PACKET (EA) ORAL ONCE
Refills: 0 | Status: COMPLETED | OUTPATIENT
Start: 2020-03-26 | End: 2020-03-26

## 2020-03-26 RX ADMIN — PIPERACILLIN AND TAZOBACTAM 25 GRAM(S): 4; .5 INJECTION, POWDER, LYOPHILIZED, FOR SOLUTION INTRAVENOUS at 05:33

## 2020-03-26 RX ADMIN — Medication 1500 MILLIGRAM(S): at 13:05

## 2020-03-26 RX ADMIN — BUDESONIDE AND FORMOTEROL FUMARATE DIHYDRATE 2 PUFF(S): 160; 4.5 AEROSOL RESPIRATORY (INHALATION) at 16:04

## 2020-03-26 RX ADMIN — Medication 200 MILLIGRAM(S): at 21:05

## 2020-03-26 RX ADMIN — AZITHROMYCIN 250 MILLIGRAM(S): 500 TABLET, FILM COATED ORAL at 12:27

## 2020-03-26 RX ADMIN — Medication 1500 MILLIGRAM(S): at 21:05

## 2020-03-26 RX ADMIN — ENOXAPARIN SODIUM 40 MILLIGRAM(S): 100 INJECTION SUBCUTANEOUS at 12:28

## 2020-03-26 RX ADMIN — BUDESONIDE AND FORMOTEROL FUMARATE DIHYDRATE 2 PUFF(S): 160; 4.5 AEROSOL RESPIRATORY (INHALATION) at 05:33

## 2020-03-26 RX ADMIN — Medication 1500 MILLIGRAM(S): at 12:27

## 2020-03-26 RX ADMIN — LEVETIRACETAM 500 MILLIGRAM(S): 250 TABLET, FILM COATED ORAL at 13:33

## 2020-03-26 RX ADMIN — Medication 10 MILLILITER(S): at 21:04

## 2020-03-26 RX ADMIN — PIPERACILLIN AND TAZOBACTAM 25 GRAM(S): 4; .5 INJECTION, POWDER, LYOPHILIZED, FOR SOLUTION INTRAVENOUS at 21:05

## 2020-03-26 RX ADMIN — Medication 200 MILLIGRAM(S): at 08:06

## 2020-03-26 RX ADMIN — Medication 40 MILLIEQUIVALENT(S): at 12:30

## 2020-03-26 RX ADMIN — PIPERACILLIN AND TAZOBACTAM 25 GRAM(S): 4; .5 INJECTION, POWDER, LYOPHILIZED, FOR SOLUTION INTRAVENOUS at 13:35

## 2020-03-26 RX ADMIN — Medication 1500 MILLIGRAM(S): at 05:33

## 2020-03-26 RX ADMIN — LEVETIRACETAM 500 MILLIGRAM(S): 250 TABLET, FILM COATED ORAL at 05:33

## 2020-03-26 NOTE — PROGRESS NOTE ADULT - SUBJECTIVE AND OBJECTIVE BOX
Oziel Murdock MD  Division of Hospital Medicine  Pager: [phone]  If no response or off-hours, page [phone]  -------------------------------------    Patient is a 49y old  Male who presents with a chief complaint of Dyspnea, COVID r/o (25 Mar 2020 13:14)      SUBJECTIVE / OVERNIGHT EVENTS: none acute  ADDITIONAL REVIEW OF SYSTEMS: pt requiring less o2 today, tolerating 5L NC well, still gets winded with even little activity. 14 point ros otherwise negative.     MEDICATIONS  (STANDING):  ascorbic acid 1500 milliGRAM(s) Oral four times a day  azithromycin  IVPB      azithromycin  IVPB 500 milliGRAM(s) IV Intermittent every 24 hours  budesonide 160 MICROgram(s)/formoterol 4.5 MICROgram(s) Inhaler 2 Puff(s) Inhalation two times a day  enoxaparin Injectable 40 milliGRAM(s) SubCutaneous daily  levETIRAcetam 500 milliGRAM(s) Oral two times a day  piperacillin/tazobactam IVPB.. 3.375 Gram(s) IV Intermittent every 8 hours  thiamine 200 milliGRAM(s) Oral <User Schedule>    MEDICATIONS  (PRN):  acetaminophen   Tablet .. 650 milliGRAM(s) Oral every 6 hours PRN Temp greater or equal to 38C (100.4F)  ALBUTerol    90 MICROgram(s) HFA Inhaler 2 Puff(s) Inhalation every 4 hours PRN Shortness of Breath and/or Wheezing  guaifenesin/dextromethorphan  Syrup 10 milliLiter(s) Oral every 4 hours PRN Cough      CAPILLARY BLOOD GLUCOSE        I&O's Summary    25 Mar 2020 07:01  -  26 Mar 2020 07:00  --------------------------------------------------------  IN: 790 mL / OUT: 450 mL / NET: 340 mL        PHYSICAL EXAM:  Vital Signs Last 24 Hrs  T(C): 36.9 (26 Mar 2020 12:25), Max: 36.9 (26 Mar 2020 12:25)  T(F): 98.4 (26 Mar 2020 12:25), Max: 98.4 (26 Mar 2020 12:25)  HR: 65 (26 Mar 2020 12:25) (61 - 78)  BP: 137/86 (26 Mar 2020 12:35) (116/68 - 140/95)  BP(mean): --  RR: 18 (26 Mar 2020 12:25) (18 - 18)  SpO2: 97% (26 Mar 2020 12:25) (96% - 98%)  CONSTITUTIONAL: NAD, well-developed, well-groomed  EYES: PERRLA; conjunctiva and sclera clear  ENMT: Moist oral mucosa, no pharyngeal injection or exudates; normal dentition  NECK: Supple, no palpable masses; no thyromegaly  RESPIRATORY: +mild tachypnea, dyspneic after activity, saturating 96-98 on 5L NC at rest.   CARDIOVASCULAR: Regular rate and rhythm, normal S1 and S2, no murmur/rub/gallop; No lower extremity edema; Peripheral pulses are 2+ bilaterally  ABDOMEN: Nontender to palpation, normoactive bowel sounds, no rebound/guarding; No hepatosplenomegaly  MUSCLOSKELETAL:  Normal gait; no clubbing or cyanosis of digits; no joint swelling or tenderness to palpation  PSYCH: A+O to person, place, and time; affect appropriate  NEUROLOGY: CN 2-12 are intact and symmetric; no gross sensory deficits;   SKIN: No rashes; no palpable lesions    LABS:                        11.6   7.73  )-----------( 323      ( 26 Mar 2020 08:38 )             35.2     03-26    141  |  99  |  11  ----------------------------<  112<H>  3.5   |  25  |  0.89    Ca    9.1      26 Mar 2020 08:38  Phos  4.0     03-26  Mg     2.7     03-26    TPro  7.4  /  Alb  3.5  /  TBili  0.7  /  DBili  x   /  AST  81<H>  /  ALT  118<H>  /  AlkPhos  152<H>  03-26    PT/INR - ( 26 Mar 2020 08:38 )   PT: 13.8 sec;   INR: 1.19 ratio         PTT - ( 25 Mar 2020 20:06 )  PTT:32.7 sec  CARDIAC MARKERS ( 25 Mar 2020 07:28 )  x     / x     / 279 U/L / x     / x              Culture - Blood (collected 23 Mar 2020 17:07)  Source: .Blood Blood  Preliminary Report (24 Mar 2020 18:02):    No growth to date.    Culture - Blood (collected 23 Mar 2020 17:07)  Source: .Blood Blood  Preliminary Report (24 Mar 2020 18:02):    No growth to date.        RADIOLOGY & ADDITIONAL TESTS:  Results Reviewed: labs    COORDINATION OF CARE:  Care Discussed with Consultants/Other Providers [Y/N]: ID  Prior or Outpatient Records Reviewed [Y/N]:

## 2020-03-26 NOTE — PROVIDER CONTACT NOTE (OTHER) - ASSESSMENT
Pt a&Ox4. Denies pain or discomfort. patient states he had one episode of diarrhea at 0526. Patient states he has been having "1-2 episodes of diarrhea per day since Sunday."

## 2020-03-27 ENCOUNTER — TRANSCRIPTION ENCOUNTER (OUTPATIENT)
Age: 50
End: 2020-03-27

## 2020-03-27 VITALS
SYSTOLIC BLOOD PRESSURE: 147 MMHG | TEMPERATURE: 98 F | DIASTOLIC BLOOD PRESSURE: 100 MMHG | OXYGEN SATURATION: 97 % | HEART RATE: 60 BPM | RESPIRATION RATE: 18 BRPM

## 2020-03-27 LAB
ALBUMIN SERPL ELPH-MCNC: 3 G/DL — LOW (ref 3.3–5)
ALP SERPL-CCNC: 150 U/L — HIGH (ref 40–120)
ALT FLD-CCNC: 123 U/L — HIGH (ref 10–45)
ANION GAP SERPL CALC-SCNC: 13 MMOL/L — SIGNIFICANT CHANGE UP (ref 5–17)
APTT BLD: 30.9 SEC — SIGNIFICANT CHANGE UP (ref 27.5–36.3)
AST SERPL-CCNC: 69 U/L — HIGH (ref 10–40)
BASOPHILS # BLD AUTO: 0.03 K/UL — SIGNIFICANT CHANGE UP (ref 0–0.2)
BASOPHILS NFR BLD AUTO: 0.4 % — SIGNIFICANT CHANGE UP (ref 0–2)
BILIRUB SERPL-MCNC: 0.6 MG/DL — SIGNIFICANT CHANGE UP (ref 0.2–1.2)
BUN SERPL-MCNC: 12 MG/DL — SIGNIFICANT CHANGE UP (ref 7–23)
CALCIUM SERPL-MCNC: 8.8 MG/DL — SIGNIFICANT CHANGE UP (ref 8.4–10.5)
CHLORIDE SERPL-SCNC: 102 MMOL/L — SIGNIFICANT CHANGE UP (ref 96–108)
CK SERPL-CCNC: 110 U/L — SIGNIFICANT CHANGE UP (ref 30–200)
CO2 SERPL-SCNC: 23 MMOL/L — SIGNIFICANT CHANGE UP (ref 22–31)
CREAT SERPL-MCNC: 0.88 MG/DL — SIGNIFICANT CHANGE UP (ref 0.5–1.3)
CRP SERPL-MCNC: 8.17 MG/DL — HIGH (ref 0–0.4)
CULTURE RESULTS: SIGNIFICANT CHANGE UP
EOSINOPHIL # BLD AUTO: 0.13 K/UL — SIGNIFICANT CHANGE UP (ref 0–0.5)
EOSINOPHIL NFR BLD AUTO: 1.8 % — SIGNIFICANT CHANGE UP (ref 0–6)
GLUCOSE SERPL-MCNC: 121 MG/DL — HIGH (ref 70–99)
HCT VFR BLD CALC: 34.1 % — LOW (ref 39–50)
HGB BLD-MCNC: 11.3 G/DL — LOW (ref 13–17)
IMM GRANULOCYTES NFR BLD AUTO: 3.8 % — HIGH (ref 0–1.5)
LYMPHOCYTES # BLD AUTO: 1.18 K/UL — SIGNIFICANT CHANGE UP (ref 1–3.3)
LYMPHOCYTES # BLD AUTO: 16.6 % — SIGNIFICANT CHANGE UP (ref 13–44)
MCHC RBC-ENTMCNC: 29.7 PG — SIGNIFICANT CHANGE UP (ref 27–34)
MCHC RBC-ENTMCNC: 33.1 GM/DL — SIGNIFICANT CHANGE UP (ref 32–36)
MCV RBC AUTO: 89.5 FL — SIGNIFICANT CHANGE UP (ref 80–100)
MONOCYTES # BLD AUTO: 0.66 K/UL — SIGNIFICANT CHANGE UP (ref 0–0.9)
MONOCYTES NFR BLD AUTO: 9.3 % — SIGNIFICANT CHANGE UP (ref 2–14)
NEUTROPHILS # BLD AUTO: 4.85 K/UL — SIGNIFICANT CHANGE UP (ref 1.8–7.4)
NEUTROPHILS NFR BLD AUTO: 68.1 % — SIGNIFICANT CHANGE UP (ref 43–77)
NRBC # BLD: 0 /100 WBCS — SIGNIFICANT CHANGE UP (ref 0–0)
PLATELET # BLD AUTO: 354 K/UL — SIGNIFICANT CHANGE UP (ref 150–400)
POTASSIUM SERPL-MCNC: 3.6 MMOL/L — SIGNIFICANT CHANGE UP (ref 3.5–5.3)
POTASSIUM SERPL-SCNC: 3.6 MMOL/L — SIGNIFICANT CHANGE UP (ref 3.5–5.3)
PROT SERPL-MCNC: 7 G/DL — SIGNIFICANT CHANGE UP (ref 6–8.3)
RBC # BLD: 3.81 M/UL — LOW (ref 4.2–5.8)
RBC # FLD: 12 % — SIGNIFICANT CHANGE UP (ref 10.3–14.5)
SODIUM SERPL-SCNC: 138 MMOL/L — SIGNIFICANT CHANGE UP (ref 135–145)
SPECIMEN SOURCE: SIGNIFICANT CHANGE UP
WBC # BLD: 7.12 K/UL — SIGNIFICANT CHANGE UP (ref 3.8–10.5)
WBC # FLD AUTO: 7.12 K/UL — SIGNIFICANT CHANGE UP (ref 3.8–10.5)

## 2020-03-27 PROCEDURE — 84145 PROCALCITONIN (PCT): CPT

## 2020-03-27 PROCEDURE — 82435 ASSAY OF BLOOD CHLORIDE: CPT

## 2020-03-27 PROCEDURE — 84100 ASSAY OF PHOSPHORUS: CPT

## 2020-03-27 PROCEDURE — 85014 HEMATOCRIT: CPT

## 2020-03-27 PROCEDURE — 93005 ELECTROCARDIOGRAM TRACING: CPT

## 2020-03-27 PROCEDURE — 87507 IADNA-DNA/RNA PROBE TQ 12-25: CPT

## 2020-03-27 PROCEDURE — 87449 NOS EACH ORGANISM AG IA: CPT

## 2020-03-27 PROCEDURE — 82728 ASSAY OF FERRITIN: CPT

## 2020-03-27 PROCEDURE — 99239 HOSP IP/OBS DSCHRG MGMT >30: CPT

## 2020-03-27 PROCEDURE — 93010 ELECTROCARDIOGRAM REPORT: CPT

## 2020-03-27 PROCEDURE — 80053 COMPREHEN METABOLIC PANEL: CPT

## 2020-03-27 PROCEDURE — 71250 CT THORAX DX C-: CPT

## 2020-03-27 PROCEDURE — 83735 ASSAY OF MAGNESIUM: CPT

## 2020-03-27 PROCEDURE — 85027 COMPLETE CBC AUTOMATED: CPT

## 2020-03-27 PROCEDURE — 87640 STAPH A DNA AMP PROBE: CPT

## 2020-03-27 PROCEDURE — 85379 FIBRIN DEGRADATION QUANT: CPT

## 2020-03-27 PROCEDURE — 87798 DETECT AGENT NOS DNA AMP: CPT

## 2020-03-27 PROCEDURE — 87581 M.PNEUMON DNA AMP PROBE: CPT

## 2020-03-27 PROCEDURE — 94640 AIRWAY INHALATION TREATMENT: CPT

## 2020-03-27 PROCEDURE — 82803 BLOOD GASES ANY COMBINATION: CPT

## 2020-03-27 PROCEDURE — 82330 ASSAY OF CALCIUM: CPT

## 2020-03-27 PROCEDURE — 99285 EMERGENCY DEPT VISIT HI MDM: CPT | Mod: 25

## 2020-03-27 PROCEDURE — 83615 LACTATE (LD) (LDH) ENZYME: CPT

## 2020-03-27 PROCEDURE — 96374 THER/PROPH/DIAG INJ IV PUSH: CPT

## 2020-03-27 PROCEDURE — 82550 ASSAY OF CK (CPK): CPT

## 2020-03-27 PROCEDURE — 82947 ASSAY GLUCOSE BLOOD QUANT: CPT

## 2020-03-27 PROCEDURE — 84132 ASSAY OF SERUM POTASSIUM: CPT

## 2020-03-27 PROCEDURE — 87641 MR-STAPH DNA AMP PROBE: CPT

## 2020-03-27 PROCEDURE — 87040 BLOOD CULTURE FOR BACTERIA: CPT

## 2020-03-27 PROCEDURE — 84295 ASSAY OF SERUM SODIUM: CPT

## 2020-03-27 PROCEDURE — 83605 ASSAY OF LACTIC ACID: CPT

## 2020-03-27 PROCEDURE — 71045 X-RAY EXAM CHEST 1 VIEW: CPT

## 2020-03-27 PROCEDURE — 84484 ASSAY OF TROPONIN QUANT: CPT

## 2020-03-27 PROCEDURE — 80048 BASIC METABOLIC PNL TOTAL CA: CPT

## 2020-03-27 PROCEDURE — 85652 RBC SED RATE AUTOMATED: CPT

## 2020-03-27 PROCEDURE — 85730 THROMBOPLASTIN TIME PARTIAL: CPT

## 2020-03-27 PROCEDURE — U0001: CPT

## 2020-03-27 PROCEDURE — 80074 ACUTE HEPATITIS PANEL: CPT

## 2020-03-27 PROCEDURE — 85610 PROTHROMBIN TIME: CPT

## 2020-03-27 PROCEDURE — 87486 CHLMYD PNEUM DNA AMP PROBE: CPT

## 2020-03-27 PROCEDURE — 86704 HEP B CORE ANTIBODY TOTAL: CPT

## 2020-03-27 PROCEDURE — 86140 C-REACTIVE PROTEIN: CPT

## 2020-03-27 PROCEDURE — 87633 RESP VIRUS 12-25 TARGETS: CPT

## 2020-03-27 RX ORDER — BUDESONIDE AND FORMOTEROL FUMARATE DIHYDRATE 160; 4.5 UG/1; UG/1
2 AEROSOL RESPIRATORY (INHALATION)
Qty: 1 | Refills: 0
Start: 2020-03-27 | End: 2020-04-25

## 2020-03-27 RX ORDER — LEVETIRACETAM 250 MG/1
1 TABLET, FILM COATED ORAL
Qty: 0 | Refills: 0 | DISCHARGE

## 2020-03-27 RX ORDER — ASCORBIC ACID 60 MG
1 TABLET,CHEWABLE ORAL
Qty: 0 | Refills: 0 | DISCHARGE
Start: 2020-03-27

## 2020-03-27 RX ORDER — ALBUTEROL 90 UG/1
2 AEROSOL, METERED ORAL
Qty: 0 | Refills: 0 | DISCHARGE
Start: 2020-03-27

## 2020-03-27 RX ORDER — ACETAMINOPHEN 500 MG
2 TABLET ORAL
Qty: 0 | Refills: 0 | DISCHARGE
Start: 2020-03-27

## 2020-03-27 RX ORDER — THIAMINE MONONITRATE (VIT B1) 100 MG
2 TABLET ORAL
Qty: 0 | Refills: 0 | DISCHARGE
Start: 2020-03-27

## 2020-03-27 RX ORDER — LEVETIRACETAM 250 MG/1
1 TABLET, FILM COATED ORAL
Qty: 0 | Refills: 0 | DISCHARGE
Start: 2020-03-27

## 2020-03-27 RX ORDER — GUAIFENESIN/DEXTROMETHORPHAN 600MG-30MG
10 TABLET, EXTENDED RELEASE 12 HR ORAL
Qty: 0 | Refills: 0 | DISCHARGE
Start: 2020-03-27

## 2020-03-27 RX ORDER — ALBUTEROL 90 UG/1
2 AEROSOL, METERED ORAL
Qty: 0 | Refills: 0 | DISCHARGE

## 2020-03-27 RX ADMIN — ENOXAPARIN SODIUM 40 MILLIGRAM(S): 100 INJECTION SUBCUTANEOUS at 12:45

## 2020-03-27 RX ADMIN — AZITHROMYCIN 250 MILLIGRAM(S): 500 TABLET, FILM COATED ORAL at 12:45

## 2020-03-27 RX ADMIN — Medication 10 MILLILITER(S): at 05:11

## 2020-03-27 RX ADMIN — Medication 200 MILLIGRAM(S): at 12:45

## 2020-03-27 RX ADMIN — Medication 1500 MILLIGRAM(S): at 05:11

## 2020-03-27 RX ADMIN — LEVETIRACETAM 500 MILLIGRAM(S): 250 TABLET, FILM COATED ORAL at 05:12

## 2020-03-27 RX ADMIN — BUDESONIDE AND FORMOTEROL FUMARATE DIHYDRATE 2 PUFF(S): 160; 4.5 AEROSOL RESPIRATORY (INHALATION) at 05:13

## 2020-03-27 RX ADMIN — PIPERACILLIN AND TAZOBACTAM 25 GRAM(S): 4; .5 INJECTION, POWDER, LYOPHILIZED, FOR SOLUTION INTRAVENOUS at 05:12

## 2020-03-27 RX ADMIN — PIPERACILLIN AND TAZOBACTAM 25 GRAM(S): 4; .5 INJECTION, POWDER, LYOPHILIZED, FOR SOLUTION INTRAVENOUS at 12:46

## 2020-03-27 RX ADMIN — Medication 1500 MILLIGRAM(S): at 12:45

## 2020-03-27 NOTE — PROGRESS NOTE ADULT - PROBLEM SELECTOR PLAN 2
- s/p plaquenil, continue azithro for now  -decreasing oxygenation requirements, on RA today  -avoid ibuprofen

## 2020-03-27 NOTE — DISCHARGE NOTE PROVIDER - NSDCCPCAREPLAN_GEN_ALL_CORE_FT
PRINCIPAL DISCHARGE DIAGNOSIS  Diagnosis: Pneumonia  Assessment and Plan of Treatment: respiratory failure due to Covid 19 with superimpose bacterial PNA  now improved  Rx augmentin 875mg oral twice daily thru 3/30 per attending      SECONDARY DISCHARGE DIAGNOSES  Diagnosis: Asthma  Assessment and Plan of Treatment: history    Diagnosis: SOB (shortness of breath)  Assessment and Plan of Treatment: PRINCIPAL DISCHARGE DIAGNOSIS  Diagnosis: Pneumonia  Assessment and Plan of Treatment: respiratory failure due to Covid 19 with superimpose bacterial PNA  now improved  Rx augmentin 875mg oral twice daily thru 3/30 per attending sent to [facility]  Pneumonia is a lung infection that can cause a fever, cough, and trouble breathing.  Continue all antibiotics as ordered until complete.  Nutrition is important, eat small frequent meals.  Get lots of rest and drink fluids.  Call your health care provider upon arrival home from hospital and make a follow up appointment for one week.  If your cough worsens, you develop fever greater than 101', you have shaking chills, a fast heartbeat, trouble breathing and/or feel your are breathing much faster than usual, call your healthcare provider.  Make sure you wash your hands frequently.        SECONDARY DISCHARGE DIAGNOSES  Diagnosis: Asthma  Assessment and Plan of Treatment: history  Rx symbicort sent to [facility]    Diagnosis: SOB (shortness of breath)  Assessment and Plan of Treatment:

## 2020-03-27 NOTE — DISCHARGE NOTE PROVIDER - NSDCMRMEDTOKEN_GEN_ALL_CORE_FT
albuterol 90 mcg/inh inhalation aerosol: 2 puff(s) inhaled , As Needed  Keppra 500 mg oral tablet: 1 tab(s) orally 2 times a day acetaminophen 325 mg oral tablet: 2 tab(s) orally every 6 hours, As needed, Temp greater or equal to 38C (100.4F)  albuterol 90 mcg/inh inhalation aerosol: 2 puff(s) inhaled every 4 hours, As needed, Shortness of Breath and/or Wheezing  amoxicillin-clavulanate 875 mg-125 mg oral tablet: 1 tab(s) orally 2 times a day   ascorbic acid 1500 mg oral tablet: 1 tab(s) orally 4 times a day  guaifenesin-dextromethorphan 100 mg-10 mg/5 mL oral liquid: 10 milliliter(s) orally every 4 hours, As needed, Cough  levETIRAcetam 500 mg oral tablet: 1 tab(s) orally 2 times a day  thiamine 100 mg oral tablet: 2 tab(s) orally acetaminophen 325 mg oral tablet: 2 tab(s) orally every 6 hours, As needed, Temp greater or equal to 38C (100.4F)  albuterol 90 mcg/inh inhalation aerosol: 2 puff(s) inhaled every 4 hours, As needed, Shortness of Breath and/or Wheezing  amoxicillin-clavulanate 875 mg-125 mg oral tablet: 1 tab(s) orally 2 times a day   budesonide-formoterol 160 mcg-4.5 mcg/inh inhalation aerosol: 2 puff(s) inhaled 2 times a day   guaifenesin-dextromethorphan 100 mg-10 mg/5 mL oral liquid: 10 milliliter(s) orally every 4 hours, As needed, Cough  levETIRAcetam 500 mg oral tablet: 1 tab(s) orally 2 times a day

## 2020-03-27 NOTE — PROVIDER CONTACT NOTE (OTHER) - REASON
HR showing 136 on pulse ox 82 89
Pt had one episode of diarrhea
Pt temp elevated, O2 decreased with 3L NC
pt temp 102.8
temp
Oral temperature of 96.7 deg F

## 2020-03-27 NOTE — PROGRESS NOTE ADULT - PROBLEM SELECTOR PLAN 1
-secondary to COVID 19 infection and likely concomitant bacterial PNA  -continue with supportive care  - decreasing o2 requirements- on RA today  - s/p plaquenil, continue azithro for now

## 2020-03-27 NOTE — DISCHARGE NOTE PROVIDER - PROVIDER TOKENS
FREE:[LAST:[renuka],FIRST:[raymundo],PHONE:[(   )    -],FAX:[(   )    -],ADDRESS:[pcp],FOLLOWUP:[2 weeks]]

## 2020-03-27 NOTE — PROGRESS NOTE ADULT - SUBJECTIVE AND OBJECTIVE BOX
Oziel Murdock MD  Division of Hospital Medicine  Pager: [phone]  If no response or off-hours, page [phone]  -------------------------------------    Patient is a 49y old  Male who presents with a chief complaint of Dyspnea, COVID r/o (27 Mar 2020 12:47)      SUBJECTIVE / OVERNIGHT EVENTS: none acute  ADDITIONAL REVIEW OF SYSTEMS:feels better, satting well on RA, resting and ambulatory. Eager to go home.    MEDICATIONS  (STANDING):  ascorbic acid 1500 milliGRAM(s) Oral four times a day  azithromycin  IVPB      azithromycin  IVPB 500 milliGRAM(s) IV Intermittent every 24 hours  budesonide 160 MICROgram(s)/formoterol 4.5 MICROgram(s) Inhaler 2 Puff(s) Inhalation two times a day  enoxaparin Injectable 40 milliGRAM(s) SubCutaneous daily  levETIRAcetam 500 milliGRAM(s) Oral two times a day  piperacillin/tazobactam IVPB.. 3.375 Gram(s) IV Intermittent every 8 hours  thiamine 200 milliGRAM(s) Oral <User Schedule>    MEDICATIONS  (PRN):  acetaminophen   Tablet .. 650 milliGRAM(s) Oral every 6 hours PRN Temp greater or equal to 38C (100.4F)  ALBUTerol    90 MICROgram(s) HFA Inhaler 2 Puff(s) Inhalation every 4 hours PRN Shortness of Breath and/or Wheezing  guaifenesin/dextromethorphan  Syrup 10 milliLiter(s) Oral every 4 hours PRN Cough      CAPILLARY BLOOD GLUCOSE        I&O's Summary    26 Mar 2020 07:01  -  27 Mar 2020 07:00  --------------------------------------------------------  IN: 300 mL / OUT: 500 mL / NET: -200 mL        PHYSICAL EXAM:  Vital Signs Last 24 Hrs  T(C): 36.4 (27 Mar 2020 14:16), Max: 37.1 (27 Mar 2020 04:45)  T(F): 97.6 (27 Mar 2020 14:16), Max: 98.8 (27 Mar 2020 04:45)  HR: 60 (27 Mar 2020 14:16) (58 - 71)  BP: 147/100 (27 Mar 2020 14:16) (108/74 - 147/100)  BP(mean): --  RR: 18 (27 Mar 2020 14:16) (18 - 19)  SpO2: 97% (27 Mar 2020 14:16) (92% - 97%)  CONSTITUTIONAL: NAD, well-developed, well-groomed  EYES: PERRLA; conjunctiva and sclera clear  ENMT: Moist oral mucosa, no pharyngeal injection or exudates; normal dentition  NECK: Supple, no palpable masses; no thyromegaly  RESPIRATORY: Normal respiratory effort;mild QUINTANILLA on exertion  CARDIOVASCULAR: Regular rate and rhythm, normal S1 and S2, no murmur/rub/gallop; No lower extremity edema; Peripheral pulses are 2+ bilaterally  ABDOMEN: Nontender to palpation, normoactive bowel sounds, no rebound/guarding; No hepatosplenomegaly  MUSCLOSKELETAL:  Normal gait; no clubbing or cyanosis of digits; no joint swelling or tenderness to palpation  PSYCH: A+O to person, place, and time; affect appropriate  NEUROLOGY: CN 2-12 are intact and symmetric; no gross sensory deficits;   SKIN: No rashes; no palpable lesions    LABS:                        11.3   7.12  )-----------( 354      ( 27 Mar 2020 06:05 )             34.1     03-27    138  |  102  |  12  ----------------------------<  121<H>  3.6   |  23  |  0.88    Ca    8.8      27 Mar 2020 06:05  Phos  3.2     03-27  Mg     2.6     03-27    TPro  7.0  /  Alb  3.0<L>  /  TBili  0.6  /  DBili  x   /  AST  69<H>  /  ALT  123<H>  /  AlkPhos  150<H>  03-27    PT/INR - ( 26 Mar 2020 08:38 )   PT: 13.8 sec;   INR: 1.19 ratio         PTT - ( 27 Mar 2020 06:05 )  PTT:30.9 sec  CARDIAC MARKERS ( 27 Mar 2020 06:05 )  x     / x     / 110 U/L / x     / x              GI PCR Panel, Stool (collected 27 Mar 2020 12:42)  Source: .Stool Feces  Final Report (27 Mar 2020 15:17):    GI PCR Results: NOT detected    *******Please Note:*******    GI panel PCR evaluates for:    Campylobacter, Plesiomonas shigelloides, Salmonella,    Vibrio, Yersinia enterocolitica, Enteroaggregative    Escherichia coli (EAEC), Enteropathogenic E.coli (EPEC),    Enterotoxigenic E. coli (ETEC) lt/st, Shiga-like    toxin-producing E. coli (STEC) stx1/stx2,    Shigella/ Enteroinvasive E. coli (EIEC), Cryptosporidium,    Cyclospora cayetanensis, Entamoeba histolytica,    Giardia lamblia, Adenovirus F 40/41, Astrovirus,    Norovirus GI/GII, Rotavirus A, Sapovirus        RADIOLOGY & ADDITIONAL TESTS:  Results Reviewed:   Imaging Personally Reviewed:  Electrocardiogram Personally Reviewed:    COORDINATION OF CARE:  Care Discussed with Consultants/Other Providers [Y/N]: ID  Prior or Outpatient Records Reviewed [Y/N]:

## 2020-03-27 NOTE — PROGRESS NOTE ADULT - PROBLEM SELECTOR PLAN 7
Transitions of Care Status:  1.  Name of PCP: Dr. Luca Aly  2.  PCP Contacted on Admission: [ ] Y    [ ] N    3.  PCP contacted at Discharge: Y  4.  Post-Discharge Appointment Date and Location: pt to make post isolation  5.  Summary of Handoff given to PCP: left msg with PCP office to discuss case on 3/27.    dispo: home today!

## 2020-03-27 NOTE — PROVIDER CONTACT NOTE (OTHER) - BACKGROUND
48 yo M positive for COVID-19. hx: asthma, seizure, meningioma
48 yo male admitted for covid-19. hx: seizure, asthma, meningioma
dx R/o covid 19
pt admitted for covid 19
pt positive Covid 19

## 2020-03-27 NOTE — DISCHARGE NOTE NURSING/CASE MANAGEMENT/SOCIAL WORK - PATIENT PORTAL LINK FT
You can access the FollowMyHealth Patient Portal offered by [facility] by registering at the following website: [email or web address]. By joining [email or web address]’s FollowMyHealth portal, you will also be able to view your health information using other applications (apps) compatible with our system.

## 2020-03-27 NOTE — PROVIDER CONTACT NOTE (OTHER) - DATE AND TIME:
19-Mar-2020 20:39
20-Mar-2020 14:54
21-Mar-2020 00:10
26-Mar-2020 05:50
27-Mar-2020 14:10
25-Mar-2020 20:05

## 2020-03-27 NOTE — DISCHARGE NOTE PROVIDER - NSDCCAREPROVSEEN_GEN_ALL_CORE_FT
[facility] Medicine, Advance PracticeTeam  [facility] Pulmonary Hospitalist, Team  Oziel Murdock

## 2020-03-27 NOTE — PROGRESS NOTE ADULT - ASSESSMENT
49 year old male with asthma on prn albuterol (never hospitalized/ never intubated), meningioma s/p cranioplasty c/b seizures (on keppra 1g BID), presenting with complaints of worsening shortness of breath, found to be COVID-19 positive, now on nrb for o2 support but with O2 requirements now on RA plan for dc home today.

## 2020-03-27 NOTE — DISCHARGE NOTE PROVIDER - HOSPITAL COURSE
· Assessment        49 year old male with asthma on prn albuterol (never hospitalized/ never intubated), meningioma s/p cranioplasty c/b seizures (on keppra 1g BID), presenting with complaints of worsening shortness of breath, found to be COVID-19 positive, now on nrb for o2 support but with O2 requirements now modestly decreasing         Problem/Plan - 1:    ·  Problem: Acute respiratory failure with hypoxia.  Plan: -secondary to COVID 19 infection and likely concomitant bacterial PNA    -continue with supportive care    - decreasing o2 requirements- on 5LNC today    - s/p plaquenil, continue azithro for now.          Problem/Plan - 2:    ·  Problem: Coronavirus infection.  Plan: - s/p plaquenil, continue azithro for now    -decreasing oxygenation requirements    -avoid ibuprofen.          Problem/Plan - 3:    ·  Problem: Pneumonia of left lower lobe due to infectious organism.  Plan: -see above    -pt had rising white count, rising procalcitonin and consolidative infiltrate on CT chest concerning for concomitant bacterial PNA    - will dc empiric vanc as mrsa swab negative    - on empiric zosyn, cont azithro, may consider switching to augmentin to complete 7 day course upon discharge.          Problem/Plan - 4:    ·  Problem: Asthma.  Plan: -c/w albuterol inhaler PRN (no duonebs)    -no oral/IV steroids at this time    -continue with symbicort    -appreciate pulm recs     -azithromycin resumed for anti-inflammatory effects.         ABX changed to Augmentin thru 3/39. Pt. cleared for d/c home by Dr. briana ARMENDARIZ

## 2020-03-28 LAB
CULTURE RESULTS: SIGNIFICANT CHANGE UP
CULTURE RESULTS: SIGNIFICANT CHANGE UP
SPECIMEN SOURCE: SIGNIFICANT CHANGE UP
SPECIMEN SOURCE: SIGNIFICANT CHANGE UP

## 2020-04-17 ENCOUNTER — OUTPATIENT (OUTPATIENT)
Dept: OUTPATIENT SERVICES | Facility: HOSPITAL | Age: 50
LOS: 1 days | End: 2020-04-17
Payer: COMMERCIAL

## 2020-04-17 ENCOUNTER — APPOINTMENT (OUTPATIENT)
Dept: RADIOLOGY | Facility: IMAGING CENTER | Age: 50
End: 2020-04-17

## 2020-04-17 DIAGNOSIS — Z00.8 ENCOUNTER FOR OTHER GENERAL EXAMINATION: ICD-10-CM

## 2020-04-17 DIAGNOSIS — Z90.49 ACQUIRED ABSENCE OF OTHER SPECIFIED PARTS OF DIGESTIVE TRACT: Chronic | ICD-10-CM

## 2020-04-17 DIAGNOSIS — Z98.890 OTHER SPECIFIED POSTPROCEDURAL STATES: Chronic | ICD-10-CM

## 2020-04-17 PROBLEM — Z00.00 ENCOUNTER FOR PREVENTIVE HEALTH EXAMINATION: Status: ACTIVE | Noted: 2020-04-17

## 2020-04-17 PROBLEM — R56.9 UNSPECIFIED CONVULSIONS: Chronic | Status: ACTIVE | Noted: 2020-03-18

## 2020-04-17 PROBLEM — J45.909 UNSPECIFIED ASTHMA, UNCOMPLICATED: Chronic | Status: ACTIVE | Noted: 2020-03-18

## 2020-04-17 PROBLEM — D32.9 BENIGN NEOPLASM OF MENINGES, UNSPECIFIED: Chronic | Status: ACTIVE | Noted: 2020-03-18

## 2020-04-17 PROCEDURE — 71046 X-RAY EXAM CHEST 2 VIEWS: CPT | Mod: 26

## 2020-04-17 PROCEDURE — 71046 X-RAY EXAM CHEST 2 VIEWS: CPT

## 2020-04-26 ENCOUNTER — MESSAGE (OUTPATIENT)
Age: 50
End: 2020-04-26

## 2020-05-01 ENCOUNTER — APPOINTMENT (OUTPATIENT)
Dept: DISASTER EMERGENCY | Facility: CLINIC | Age: 50
End: 2020-05-01

## 2020-05-02 LAB
SARS-COV-2 IGG SERPL IA-ACNC: 177 INDEX
SARS-COV-2 IGG SERPL QL IA: POSITIVE

## 2020-05-08 ENCOUNTER — APPOINTMENT (OUTPATIENT)
Dept: ORTHOPEDIC SURGERY | Facility: CLINIC | Age: 50
End: 2020-05-08
Payer: COMMERCIAL

## 2020-05-08 VITALS — TEMPERATURE: 96.9 F

## 2020-05-08 DIAGNOSIS — M54.2 CERVICALGIA: ICD-10-CM

## 2020-05-08 DIAGNOSIS — M75.41 IMPINGEMENT SYNDROME OF RIGHT SHOULDER: ICD-10-CM

## 2020-05-08 PROCEDURE — 73030 X-RAY EXAM OF SHOULDER: CPT | Mod: RT

## 2020-05-08 PROCEDURE — 72040 X-RAY EXAM NECK SPINE 2-3 VW: CPT

## 2020-05-08 PROCEDURE — 99203 OFFICE O/P NEW LOW 30 MIN: CPT

## 2020-05-08 NOTE — DISCUSSION/SUMMARY
[de-identified] : 50-year-old male with shoulder impingement and cervicalgia. He reports occasional numbness in his hand consistent with C7 radiculopathy versus ulnar nerve we discussed diagnostic and treatment options at length. He is requesting an MRI at this time shoulder neck he may followup after MRI. All questions were answered. He will f/u after MRI

## 2020-05-08 NOTE — PHYSICAL EXAM
[de-identified] : General Exam\par \par Well developed, well nourished\par No apparent distress\par Oriented to person, place, and time\par Mood: Normal\par Affect: Normal\par Balance and coordination: Normal\par Gait: Normal\par \par Right shoulder/C spine exam\par \par Inspection: No swelling, ecchymosis or gross deformity.\par Skin: No masses, No lesions\par Tenderness: + paraspinal ttp C spine, No bicipital tenderness, no tenderness to the greater tuberosity/RTC insertion, no anterior shoulder/lesser tuberosity tenderness. No tenderness SC joint, clavicle, AC joint.\par neck + pain w cervical rom neg spurling\par ROM: 160/60/T6\par Impingement tests: mild Positive Ortiz\par AC Joint: no pain with cross arm testing\par Biceps: Negative speed\par Strength: 5/5 abduction, external rotation, and internal rotation\par Neuro: AIN, PIN, Ulnar nerve motor intact\par Sensation: Intact to light touch in radial, median, ulnar, and axillary nerve distributions\par Vasc: 2+ radial pulse [de-identified] : \par The following radiographs were ordered and read by me during this patients visit. I reviewed each radiograph in detail with the patient and discussed the findings as highlighted below. \par \par 3 views right shoulder were obtained today that show no fracture, dislocation. There is no degenerative change seen. There is no malalignment. No obvious osseous abnormality. Otherwise unremarkable.\par \par AP and lateral of the cervical spine were obtained today. There is multilevel spondylosis. Joint space is otherwise well maintained no fracture\par \par

## 2020-05-08 NOTE — HISTORY OF PRESENT ILLNESS
[de-identified] : 49yo male presents complaining of right anterior shoulder pain and right lateral neck pain for several years. His pain in her shoulder worse with overhead activity and reaching behind back. He also feels pain right aspect of his neck. This is worse with lifting weights at the gym. He intermittently feels pain and numbness in his right pinky finger, he is unsure if this is related to his shoulder or neck. He has gone to a chiropractor in the past. He takes Advil as needed.\par \par The patient's past medical history, past surgical history, medications, allergies, and social history were reviewed by me today with the patient and documented accordingly. In addition, the patient's family history, which is noncontributory to this visit, was also reviewed.\par

## 2020-05-13 ENCOUNTER — OUTPATIENT (OUTPATIENT)
Dept: OUTPATIENT SERVICES | Facility: HOSPITAL | Age: 50
LOS: 1 days | End: 2020-05-13
Payer: COMMERCIAL

## 2020-05-13 ENCOUNTER — APPOINTMENT (OUTPATIENT)
Dept: MRI IMAGING | Facility: CLINIC | Age: 50
End: 2020-05-13
Payer: COMMERCIAL

## 2020-05-13 DIAGNOSIS — Z00.00 ENCOUNTER FOR GENERAL ADULT MEDICAL EXAMINATION WITHOUT ABNORMAL FINDINGS: ICD-10-CM

## 2020-05-13 DIAGNOSIS — Z98.890 OTHER SPECIFIED POSTPROCEDURAL STATES: Chronic | ICD-10-CM

## 2020-05-13 DIAGNOSIS — Z90.49 ACQUIRED ABSENCE OF OTHER SPECIFIED PARTS OF DIGESTIVE TRACT: Chronic | ICD-10-CM

## 2020-05-13 DIAGNOSIS — M75.41 IMPINGEMENT SYNDROME OF RIGHT SHOULDER: ICD-10-CM

## 2020-05-13 PROCEDURE — 73221 MRI JOINT UPR EXTREM W/O DYE: CPT

## 2020-05-13 PROCEDURE — 73221 MRI JOINT UPR EXTREM W/O DYE: CPT | Mod: 26,RT

## 2020-05-13 PROCEDURE — 72141 MRI NECK SPINE W/O DYE: CPT | Mod: 26

## 2020-05-13 PROCEDURE — 72141 MRI NECK SPINE W/O DYE: CPT

## 2020-05-18 ENCOUNTER — APPOINTMENT (OUTPATIENT)
Dept: ORTHOPEDIC SURGERY | Facility: CLINIC | Age: 50
End: 2020-05-18
Payer: COMMERCIAL

## 2020-05-18 VITALS — BODY MASS INDEX: 30.82 KG/M2 | WEIGHT: 185 LBS | HEIGHT: 65 IN

## 2020-05-18 DIAGNOSIS — M50.30 OTHER CERVICAL DISC DEGENERATION, UNSPECIFIED CERVICAL REGION: ICD-10-CM

## 2020-05-18 PROCEDURE — 99214 OFFICE O/P EST MOD 30 MIN: CPT

## 2020-05-22 ENCOUNTER — APPOINTMENT (OUTPATIENT)
Dept: ORTHOPEDIC SURGERY | Facility: CLINIC | Age: 50
End: 2020-05-22

## 2020-07-01 NOTE — ED PROVIDER NOTE - SECONDARY DIAGNOSIS.
Detail Level: Simple Additional Notes: Patient consent was obtained to proceed with the visit and recommended plan of care after discussion of all risks and benefits, including the risks of COVID-19 exposure. SOB (shortness of breath)

## 2020-07-20 ENCOUNTER — APPOINTMENT (OUTPATIENT)
Dept: ORTHOPEDIC SURGERY | Facility: CLINIC | Age: 50
End: 2020-07-20

## 2020-09-01 ENCOUNTER — APPOINTMENT (OUTPATIENT)
Dept: ORTHOPEDIC SURGERY | Facility: CLINIC | Age: 50
End: 2020-09-01

## 2020-09-17 ENCOUNTER — NON-APPOINTMENT (OUTPATIENT)
Age: 50
End: 2020-09-17

## 2020-09-17 ENCOUNTER — APPOINTMENT (OUTPATIENT)
Dept: OPHTHALMOLOGY | Facility: CLINIC | Age: 50
End: 2020-09-17
Payer: COMMERCIAL

## 2020-09-17 PROCEDURE — 92004 COMPRE OPH EXAM NEW PT 1/>: CPT

## 2020-09-23 ENCOUNTER — TRANSCRIPTION ENCOUNTER (OUTPATIENT)
Age: 50
End: 2020-09-23

## 2020-09-26 NOTE — PROGRESS NOTE ADULT - PROVIDER SPECIALTY LIST ADULT
Cardiovascular Surgery Progress Note    Name: Guerrero Ann  MRN: [id number]  : 1973  Admit Date: 2020  7:37 PM  Procedure:  Procedure(s) and Anesthesia Type:     * CABG, WITH ENDOSCOPIC VEIN PROCUREMENT- X3 - General     * ECHOCARDIOGRAM, TRANSESOPHAGEAL - General  1 Day Post-Op    Vitals:  Patient Vitals for the past 8 hrs:   Monitored Temp 2 SpO2 O2 Delivery Device O2 (LPM) Pulse Resp BP Weight   20 0500 37.2 °C (99 °F) 95 % -- -- 86 (!) 24 -- 75.1 kg (165 lb 9.1 oz)   20 0400 37.4 °C (99.3 °F) 98 % Silicone Nasal Cannula 5 (!) 105 (!) 22 127/65 --   20 0350 -- -- Silicone Nasal Cannula 5 -- -- -- --   20 0300 37.4 °C (99.3 °F) 100 % Silicone Nasal Cannula 5 89 (!) 23 100/67 --   20 0200 37.3 °C (99.1 °F) 100 % Silicone Nasal Cannula 6 99 (!) 31 116/70 --   20 0100 37.2 °C (99 °F) 96 % Silicone Nasal Cannula 6 93 (!) 24 114/65 --   20 0000 37.4 °C (99.3 °F) 98 % Silicone Nasal Cannula 6 84 20 119/82 --   20 2300 37.4 °C (99.3 °F) 98 % -- 1 75 20 (!) 99/59 --     Temp (24hrs), Av.4 °C (97.6 °F), Min:36 °C (96.8 °F), Max:37.5 °C (99.5 °F)      Respiratory:  Vent Mode: ASV, PEEP/CPAP: 8, PIP: 15, MAP: 16 Respiration: (!) 24, Pulse Oximetry: 95 %     Chest Tube Drains:          Fluids:    Intake/Output Summary (Last 24 hours) at 2020 0603  Last data filed at 2020 0400  Gross per 24 hour   Intake 5329.96 ml   Output 3957 ml   Net 1372.96 ml     Admit weight: Weight: 72.6 kg (160 lb)  Current weight: Weight: 75.1 kg (165 lb 9.1 oz) (20 0500)    Labs:  Recent Labs     20  0555 20  1421 20  2151 20  0206   WBC 9.1  --  12.7* 12.1*   RBC 4.00*  --  2.61* 2.55*   HEMOGLOBIN 13.1*  --  8.7* 8.5*   HEMATOCRIT 37.9*  --  25.5* 24.6*   MCV 94.8  --  96.6 96.5   MCH 32.8  --  33.3* 33.3*   MCHC 34.6  --  34.5 34.6   RDW 41.3  --  43.4 43.3   PLATELETCT 209 125* 231 191   MPV 10.9  --  10.9 11.1  Hospitalist     Recent Labs     09/24/20 1656 09/25/20  0555   NEUTSPOLYS 48.90 51.00   LYMPHOCYTES 32.60 31.20   MONOCYTES 9.30 7.60   EOSINOPHILS 7.50* 8.60*   BASOPHILS 1.40 1.30     Recent Labs     09/24/20  1656 09/25/20  0555  09/25/20  2302 09/26/20  0039 09/26/20  0517   SODIUM 139 137  --   --  137  --    POTASSIUM 3.7 3.6   < > 4.0 4.5 3.8   CHLORIDE 101 101  --   --  104  --    CO2 22 19*  --   --  16*  --    GLUCOSE 159* 157*  --   --  130*  --    BUN 15 23*  --   --  17  --    CREATININE 1.02 1.00  --   --  1.06  --    CALCIUM 9.6 9.4  --   --  7.7*  --     < > = values in this interval not displayed.     Recent Labs     09/24/20 1656 09/25/20  1421   APTT 28.8 31.0   INR 0.98 1.34*       Medications:  • NS       • [START ON 9/27/2020] lisinopril  5 mg     • potassium chloride SA  20 mEq     • furosemide  40 mg     • potassium chloride (KCL-CENTRAL) IV *Administer in ICU only*  10 mEq     • carvedilol  3.125 mg     • magnesium sulfate  1 g 0 g (09/25/20 1537)   • Pharmacy Consult Request  1 Each     • acetaminophen  1,000 mg     • gabapentin  300 mg      Followed by   • [START ON 10/1/2020] gabapentin  100 mg     • senna-docusate  2 Tab      And   • polyethylene glycol/lytes  1 Packet      And   • [START ON 9/27/2020] magnesium hydroxide  30 mL     • omeprazole  20 mg     • enoxaparin  40 mg     • mupirocin  1 Application     • aspirin EC  81 mg     • clopidogrel  75 mg     • rosuvastatin  20 mg     • insulin regular  0-14 Units          Ordered Medications:    ASA - Yes    Plavix - Yes    Post-operative Beta Blockers - Yes    Ace Inhibitor - Yes    Statin - Yes         Exam:   Review of Systems   Constitutional: Positive for malaise/fatigue.   HENT: Negative.    Eyes: Negative.    Respiratory: Negative.    Cardiovascular: Negative.    Gastrointestinal: Negative.    Genitourinary: Negative.    Musculoskeletal: Negative.    Skin: Negative.    Neurological: Negative.    Endo/Heme/Allergies: Negative.     Psychiatric/Behavioral: Negative.        Physical Exam  Constitutional:       General: He is not in acute distress.     Appearance: He is well-developed. He is not diaphoretic.   Neck:      Musculoskeletal: Neck supple.   Cardiovascular:      Rate and Rhythm: Normal rate and regular rhythm.      Heart sounds: Normal heart sounds. No murmur. No friction rub. No gallop.    Pulmonary:      Effort: Pulmonary effort is normal. No respiratory distress.      Breath sounds: Examination of the right-lower field reveals decreased breath sounds. Examination of the left-lower field reveals decreased breath sounds. Decreased breath sounds present. No wheezing or rales.   Abdominal:      General: There is no distension.      Palpations: Abdomen is soft.      Tenderness: There is no abdominal tenderness.   Skin:     General: Skin is warm and dry.      Comments: Sternum- prevena dressing  SVG sites- covered   Neurological:      Mental Status: He is alert and oriented to person, place, and time.         Quality Measures:   Quality-Core Measures   Reviewed items::  EKG reviewed, Labs reviewed, Medications reviewed and Radiology images reviewed      Assessment/Plan:  POD 1 HDS, SR, Acute blood loss anemia- s/p transfusion- keep mediastinal tubes, gently diurese, d/c gomez    Active Hospital Problems    Diagnosis   • Encounter for weaning from ventilator ([facility]) [Z99.11]     Priority: High   • NSTEMI (non-ST elevated myocardial infarction) ([facility]) [I21.4]     Priority: High   • CAD (coronary artery disease) [I25.10]     Priority: High   • Type 2 diabetes mellitus with complication, without long-term current use of insulin ([facility]) [E11.8]     Priority: Medium   • Essential hypertension [I10]     Priority: Medium   • SONNY (acute kidney injury) ([facility]) [N17.9]

## 2020-09-29 ENCOUNTER — APPOINTMENT (OUTPATIENT)
Dept: ORTHOPEDIC SURGERY | Facility: CLINIC | Age: 50
End: 2020-09-29
Payer: COMMERCIAL

## 2020-09-29 VITALS
HEIGHT: 63.5 IN | WEIGHT: 192 LBS | HEART RATE: 71 BPM | DIASTOLIC BLOOD PRESSURE: 89 MMHG | BODY MASS INDEX: 33.6 KG/M2 | SYSTOLIC BLOOD PRESSURE: 156 MMHG

## 2020-09-29 DIAGNOSIS — M25.562 PAIN IN RIGHT KNEE: ICD-10-CM

## 2020-09-29 DIAGNOSIS — M25.561 PAIN IN RIGHT KNEE: ICD-10-CM

## 2020-09-29 DIAGNOSIS — G89.29 PAIN IN RIGHT KNEE: ICD-10-CM

## 2020-09-29 PROCEDURE — 99215 OFFICE O/P EST HI 40 MIN: CPT

## 2020-09-29 PROCEDURE — 73564 X-RAY EXAM KNEE 4 OR MORE: CPT | Mod: 50

## 2020-09-29 NOTE — DISCUSSION/SUMMARY
[de-identified] : This patient has bilateral knee pain.  Normal examination today.  The patient is not an appropriate candidate for surgical intervention at this time. An extensive discussion was conducted on the natural history of the disease and the variety of surgical and non-surgical options available to the patient including, but not limited to non-steroidal anti-inflammatory medications, steroid injections, physical therapy, maintenance of ideal body weight, and reduction of activity.  Suggested a home exercise program as well as a course of physical therapy.  Suggested over-the-counter NSAIDs PRN pain.  Suggested use of a neoprene sleeve knee brace.  The patient will schedule an appointment as needed.\par

## 2020-09-29 NOTE — PHYSICAL EXAM
[de-identified] : Patient is well nourished, well-developed, in no acute distress, with appropriate mood and affect. The patient is oriented to time, place, and person. Respirations are even and unlabored. Gait evaluation does not reveal a limp. There is no inguinal adenopathy. The right limb is well-perfused and showed 2+ dp/pt pulses, without skin lesions, shows a grossly normal motor and sensory examination. Right knee motion is painless and the knee moves from 0 to 135 degrees. The knee is stable within that range-of-motion to AP and ML stress with a 1A Lachman, negative anterior or posterior drawer and no instability to varus or valgus stress. The alignment of the knee is 5 degrees varus. No effusion or crepitus is noted. No tenderness to palpation about the medial or lateral joint line, medial or lateral tibial plateau, medial or lateral femoral condyle, medial or lateral patellar facets, superior or inferior pole of the patella. Macario's is negative. Muscle strength is normal. Pedal pulses are palpable. Hip examination was negative. The left limb is well-perfused and showed 2+ dp/pt pulses, without skin lesions, shows a grossly normal motor and sensory examination. Left knee motion is painless and the knee moves from 0 to 135 degrees. The knee is stable within that range-of-motion to AP and ML stress with a 1A Lachman, negative anterior or posterior drawer and no instability to varus or valgus stress. The alignment of the knee is 5 degrees varus. No effusion or crepitus is noted. No tenderness to palpation about the medial or lateral joint line, medial or lateral tibial plateau, medial or lateral femoral condyle, medial or lateral patellar facets, superior or inferior pole of the patella. Macario's is negative. Muscle strength is normal. Pedal pulses are palpable. Hip examination was negative. [de-identified] : Long standing knee, AP knee, lateral knee, and patellar views of the bilateral knee were ordered and taken in the office and demonstrate no evidence of degenerative joint disease of the knee, fractures, intra-articular pathology.

## 2020-09-29 NOTE — HISTORY OF PRESENT ILLNESS
[de-identified] : This is very nice 50-year-old gentleman experiencing bilateral knee pain, which is mild in intensity.  Pain is mainly located anterior aspect of the knee.  He denies any catching clicking or locking the knee and the knee is not giving way.  It is worse when he is walking up and down stairs and squatting.  He is to be more active but has recently become less active since working remotely from home and sits at a computer most the day.  He is no longer doing a home exercise program.  Pain does not limit his activities daily living only mildly.  The walking tolerance is not reduced.  He does not take any medication for this.  He does not use a cane or walker.  He did not use a neoprene sleeve knee brace.  He has not tried physical therapy for this.  The patient denies any radiation of the pain to the feet and it is not associated with numbness, tingling, or weakness.

## 2020-11-18 NOTE — PROGRESS NOTE ADULT - PROBLEM SELECTOR PLAN 5
[Alert] : alert [Well Nourished] : well nourished [No Acute Distress] : no acute distress [Well Developed] : well developed [Normal Sclera/Conjunctiva] : normal sclera/conjunctiva [EOMI] : extra ocular movement intact [No Proptosis] : no proptosis [Normal Oropharynx] : the oropharynx was normal [Thyroid Not Enlarged] : the thyroid was not enlarged [No Thyroid Nodules] : no palpable thyroid nodules [No Respiratory Distress] : no respiratory distress [No Accessory Muscle Use] : no accessory muscle use [Clear to Auscultation] : lungs were clear to auscultation bilaterally [Normal S1, S2] : normal S1 and S2 [Normal Rate] : heart rate was normal [Regular Rhythm] : with a regular rhythm -c/w keppra 500mg BID [No Edema] : no peripheral edema [Pedal Pulses Normal] : the pedal pulses are present [Normal Bowel Sounds] : normal bowel sounds [Not Tender] : non-tender [Not Distended] : not distended [Soft] : abdomen soft [Normal Anterior Cervical Nodes] : no anterior cervical lymphadenopathy [Normal Posterior Cervical Nodes] : no posterior cervical lymphadenopathy [No Spinal Tenderness] : no spinal tenderness [Spine Straight] : spine straight [No Stigmata of Cushings Syndrome] : no stigmata of Cushings Syndrome [Normal Gait] : normal gait [Normal Strength/Tone] : muscle strength and tone were normal [No Rash] : no rash [Normal Reflexes] : deep tendon reflexes were 2+ and symmetric [No Tremors] : no tremors [Oriented x3] : oriented to person, place, and time [Acanthosis Nigricans] : no acanthosis nigricans

## 2021-04-20 ENCOUNTER — APPOINTMENT (OUTPATIENT)
Dept: INTERNAL MEDICINE | Facility: CLINIC | Age: 51
End: 2021-04-20
Payer: COMMERCIAL

## 2021-04-20 ENCOUNTER — NON-APPOINTMENT (OUTPATIENT)
Age: 51
End: 2021-04-20

## 2021-04-20 VITALS
SYSTOLIC BLOOD PRESSURE: 147 MMHG | HEIGHT: 63.78 IN | OXYGEN SATURATION: 100 % | WEIGHT: 188.03 LBS | TEMPERATURE: 97.3 F | HEART RATE: 54 BPM | BODY MASS INDEX: 32.5 KG/M2 | DIASTOLIC BLOOD PRESSURE: 83 MMHG

## 2021-04-20 VITALS — SYSTOLIC BLOOD PRESSURE: 134 MMHG | DIASTOLIC BLOOD PRESSURE: 86 MMHG

## 2021-04-20 DIAGNOSIS — Z78.9 OTHER SPECIFIED HEALTH STATUS: ICD-10-CM

## 2021-04-20 DIAGNOSIS — Z82.49 FAMILY HISTORY OF ISCHEMIC HEART DISEASE AND OTHER DISEASES OF THE CIRCULATORY SYSTEM: ICD-10-CM

## 2021-04-20 PROCEDURE — 99396 PREV VISIT EST AGE 40-64: CPT | Mod: 25

## 2021-04-20 PROCEDURE — 36415 COLL VENOUS BLD VENIPUNCTURE: CPT

## 2021-04-20 PROCEDURE — 99072 ADDL SUPL MATRL&STAF TM PHE: CPT

## 2021-04-20 PROCEDURE — 93000 ELECTROCARDIOGRAM COMPLETE: CPT

## 2021-04-22 PROBLEM — Z82.49 FAMILY HISTORY OF CORONARY ARTERY DISEASE: Status: ACTIVE | Noted: 2021-04-22

## 2021-04-22 PROBLEM — Z78.9 CURRENT NON-SMOKER: Status: ACTIVE | Noted: 2021-04-22

## 2021-04-22 PROBLEM — Z78.9 CURRENT NON-DRINKER OF ALCOHOL: Status: ACTIVE | Noted: 2021-04-22

## 2021-04-25 LAB
25(OH)D3 SERPL-MCNC: 22.3 NG/ML
ALBUMIN SERPL ELPH-MCNC: 4.5 G/DL
ALP BLD-CCNC: 88 U/L
ALT SERPL-CCNC: 16 U/L
ANION GAP SERPL CALC-SCNC: 14 MMOL/L
APPEARANCE: CLEAR
APPEARANCE: CLEAR
AST SERPL-CCNC: 18 U/L
BACTERIA: NEGATIVE
BASOPHILS # BLD AUTO: 0.05 K/UL
BASOPHILS NFR BLD AUTO: 0.8 %
BILIRUB SERPL-MCNC: 0.7 MG/DL
BILIRUBIN URINE: NEGATIVE
BILIRUBIN URINE: NEGATIVE
BLOOD URINE: NEGATIVE
BLOOD URINE: NEGATIVE
BUN SERPL-MCNC: 15 MG/DL
CALCIUM SERPL-MCNC: 9 MG/DL
CHLORIDE SERPL-SCNC: 105 MMOL/L
CHOLEST SERPL-MCNC: 205 MG/DL
CO2 SERPL-SCNC: 22 MMOL/L
COLOR: NORMAL
COLOR: NORMAL
CREAT SERPL-MCNC: 0.95 MG/DL
EOSINOPHIL # BLD AUTO: 0.13 K/UL
EOSINOPHIL NFR BLD AUTO: 2 %
ESTIMATED AVERAGE GLUCOSE: 120 MG/DL
FSH SERPL-MCNC: 3.3 IU/L
GLUCOSE QUALITATIVE U: NEGATIVE
GLUCOSE QUALITATIVE U: NEGATIVE
GLUCOSE SERPL-MCNC: 101 MG/DL
HBA1C MFR BLD HPLC: 5.8 %
HCT VFR BLD CALC: 46.5 %
HDLC SERPL-MCNC: 38 MG/DL
HGB BLD-MCNC: 14.6 G/DL
HYALINE CASTS: 0 /LPF
IMM GRANULOCYTES NFR BLD AUTO: 0.2 %
KETONES URINE: NEGATIVE
KETONES URINE: NEGATIVE
LDLC SERPL CALC-MCNC: 134 MG/DL
LEUKOCYTE ESTERASE URINE: NEGATIVE
LEUKOCYTE ESTERASE URINE: NEGATIVE
LH SERPL-ACNC: 2.2 IU/L
LYMPHOCYTES # BLD AUTO: 1.76 K/UL
LYMPHOCYTES NFR BLD AUTO: 27.4 %
MAN DIFF?: NORMAL
MCHC RBC-ENTMCNC: 29.7 PG
MCHC RBC-ENTMCNC: 31.4 GM/DL
MCV RBC AUTO: 94.7 FL
MICROSCOPIC-UA: NORMAL
MONOCYTES # BLD AUTO: 0.46 K/UL
MONOCYTES NFR BLD AUTO: 7.2 %
NEUTROPHILS # BLD AUTO: 4.01 K/UL
NEUTROPHILS NFR BLD AUTO: 62.4 %
NITRITE URINE: NEGATIVE
NITRITE URINE: NEGATIVE
NONHDLC SERPL-MCNC: 167 MG/DL
PH URINE: 6
PH URINE: 6
PLATELET # BLD AUTO: 187 K/UL
POTASSIUM SERPL-SCNC: 4.4 MMOL/L
PROLACTIN SERPL-MCNC: 8.1 NG/ML
PROT SERPL-MCNC: 7.6 G/DL
PROTEIN URINE: NORMAL
PROTEIN URINE: NORMAL
PSA SERPL-MCNC: 2.92 NG/ML
RBC # BLD: 4.91 M/UL
RBC # FLD: 12.2 %
RED BLOOD CELLS URINE: 2 /HPF
SODIUM SERPL-SCNC: 141 MMOL/L
SPECIFIC GRAVITY URINE: 1.02
SPECIFIC GRAVITY URINE: 1.02
SQUAMOUS EPITHELIAL CELLS: 0 /HPF
TRIGL SERPL-MCNC: 164 MG/DL
TSH SERPL-ACNC: 1.36 UIU/ML
UROBILINOGEN URINE: NORMAL
UROBILINOGEN URINE: NORMAL
WBC # FLD AUTO: 6.42 K/UL
WHITE BLOOD CELLS URINE: 0 /HPF

## 2021-05-02 LAB
LEVETIRACETAM SERPL-MCNC: 6.2 UG/ML
TESTOST BND SERPL-MCNC: 8.1 PG/ML
TESTOST SERPL-MCNC: 234.7 NG/DL

## 2021-05-05 ENCOUNTER — APPOINTMENT (OUTPATIENT)
Dept: ENDOCRINOLOGY | Facility: CLINIC | Age: 51
End: 2021-05-05
Payer: COMMERCIAL

## 2021-05-05 VITALS
TEMPERATURE: 97.7 F | WEIGHT: 188.13 LBS | DIASTOLIC BLOOD PRESSURE: 85 MMHG | SYSTOLIC BLOOD PRESSURE: 142 MMHG | BODY MASS INDEX: 32.12 KG/M2 | HEIGHT: 64.17 IN | OXYGEN SATURATION: 98 % | HEART RATE: 56 BPM

## 2021-05-05 PROCEDURE — 36415 COLL VENOUS BLD VENIPUNCTURE: CPT

## 2021-05-05 PROCEDURE — 99205 OFFICE O/P NEW HI 60 MIN: CPT | Mod: 25

## 2021-05-05 PROCEDURE — 99072 ADDL SUPL MATRL&STAF TM PHE: CPT

## 2021-05-09 NOTE — PHYSICAL EXAM
[Alert] : alert [Well Nourished] : well nourished [Healthy Appearance] : healthy appearance [Obese] : obese [No Acute Distress] : no acute distress [Well Developed] : well developed [Normal Voice/Communication] : normal voice communication [Normal Sclera/Conjunctiva] : normal sclera/conjunctiva [No Proptosis] : no proptosis [No Neck Mass] : no neck mass was observed [No LAD] : no lymphadenopathy [Supple] : the neck was supple [Thyroid Not Enlarged] : the thyroid was not enlarged [No Thyroid Nodules] : no palpable thyroid nodules [No Respiratory Distress] : no respiratory distress [Normal Rate] : heart rate was normal [No Stigmata of Cushings Syndrome] : no stigmata of Cushings Syndrome [Normal Gait] : normal gait [No Clubbing, Cyanosis] : no clubbing  or cyanosis of the fingernails [No Involuntary Movements] : no involuntary movements were seen [Acanthosis Nigricans] : no acanthosis nigricans [No Tremors] : no tremors [Normal Affect] : the affect was normal [Normal Insight/Judgement] : insight and judgment were intact [Normal Mood] : the mood was normal

## 2021-05-09 NOTE — ASSESSMENT
[FreeTextEntry1] : This is a 51-year-old male with low testosterone, prediabetes, hyperlipidemia, vitamin D insufficiency, obesity, COVID-19 infection in 2020, here for consultation.\par Blood work from May 2, 2021 showed total testosterone 234.7.  He reports he has had low testosterone for approximately 2 years.  He is not currently on testosterone replacement.\par He reports poor libido and erectile dysfunction. \par TSH and prolactin are normal. \par LH and FSH are inappropriately normal. \par Check repeat morning fasting testosterone level.  If it is still low, will check MRI of the pituitary to evaluate pituitary adenoma inappropriately normal LH and FSH.

## 2021-05-09 NOTE — CONSULT LETTER
[Dear  ___] : Dear  [unfilled], [Consult Letter:] : I had the pleasure of evaluating your patient, [unfilled]. [Please see my note below.] : Please see my note below. [Consult Closing:] : Thank you very much for allowing me to participate in the care of this patient.  If you have any questions, please do not hesitate to contact me. [Sincerely,] : Sincerely, [FreeTextEntry3] : Salome Palacios MD, FACE\par

## 2021-05-09 NOTE — REVIEW OF SYSTEMS
[Recent Weight Loss (___ Lbs)] : recent weight loss: [unfilled] lbs [Erectile Dysfunction] : erectile dysfunction [Decreased Libido] : decreased libido [All other systems negative] : All other systems negative

## 2021-05-09 NOTE — HISTORY OF PRESENT ILLNESS
[FreeTextEntry1] : CC: Low testosterone\par This is a 51-year-old male with low testosterone, prediabetes, hyperlipidemia, vitamin D insufficiency, obesity, COVID-19 infection in 2020, here for consultation.\par Blood work from May 2, 2021 showed total testosterone 234.7.  He reports he has had low testosterone for approximately 2 years.  He is not currently on testosterone replacement.\par He reports poor libido and erectile dysfunction. \par He takes Cialis for ED.  TSH and prolactin are normal. \par LH and FSH are inappropriately normal. \par He had DXA in the past but is unsure if he has osteoporosis. \par

## 2021-05-10 LAB
TESTOST BND SERPL-MCNC: 8.4 PG/ML
TESTOST SERPL-MCNC: 209.6 NG/DL

## 2021-05-12 NOTE — ASSESSMENT
[FreeTextEntry1] : Blood work done today, given hx of CAD, and non specific T wave abnormality advised to see cardiology, check PSA given BPH, will assess other cardiac risk factors advised low-fat diet and exercise.  We will also check testosterone levels prolactin and LH FSH to assess between primary and secondary hypogonadism.

## 2021-05-12 NOTE — PHYSICAL EXAM
[Normal] : normal gait, coordination grossly intact, no focal deficits and deep tendon reflexes were 2+ and symmetric [de-identified] : enlarged prostate

## 2021-05-12 NOTE — HISTORY OF PRESENT ILLNESS
[FreeTextEntry1] : 51-year-old male presents for annual physical [de-identified] : 51-year-old male presents for annual physical.  Feels well, denies any chest pain chest tightness dyspnea on exertion denies any lower extremity edema change in bowel habits urination last colonoscopy was 2 years ago.  Does have a family history of coronary artery disease.  Father had MI in his 50s.  Watches diet denies any history of hyperlipidemia.  Also has a history of low testosterone unsure of etiology denies any witnessed apneic episodes or snoring.

## 2021-05-12 NOTE — ADDENDUM
[FreeTextEntry1] : Patient is medically optimized for cataract surgery, also has appointment with cardiology on 6/1/21.

## 2021-05-12 NOTE — HEALTH RISK ASSESSMENT
[Good] : ~his/her~  mood as  good [No] : No [0] : 2) Feeling down, depressed, or hopeless: Not at all (0) [FreeTextEntry1] : health maintenance [] : No [de-identified] : none [de-identified] : orthopedic [[id number]] : 0 2 seconds or less

## 2021-05-25 ENCOUNTER — OUTPATIENT (OUTPATIENT)
Dept: OUTPATIENT SERVICES | Facility: HOSPITAL | Age: 51
LOS: 1 days | End: 2021-05-25
Payer: COMMERCIAL

## 2021-05-25 ENCOUNTER — APPOINTMENT (OUTPATIENT)
Dept: MRI IMAGING | Facility: CLINIC | Age: 51
End: 2021-05-25
Payer: COMMERCIAL

## 2021-05-25 DIAGNOSIS — Z90.49 ACQUIRED ABSENCE OF OTHER SPECIFIED PARTS OF DIGESTIVE TRACT: Chronic | ICD-10-CM

## 2021-05-25 DIAGNOSIS — Z98.890 OTHER SPECIFIED POSTPROCEDURAL STATES: Chronic | ICD-10-CM

## 2021-05-25 DIAGNOSIS — R79.89 OTHER SPECIFIED ABNORMAL FINDINGS OF BLOOD CHEMISTRY: ICD-10-CM

## 2021-05-25 DIAGNOSIS — Z00.8 ENCOUNTER FOR OTHER GENERAL EXAMINATION: ICD-10-CM

## 2021-05-25 PROCEDURE — A9585: CPT

## 2021-05-25 PROCEDURE — 70553 MRI BRAIN STEM W/O & W/DYE: CPT

## 2021-05-25 PROCEDURE — 70553 MRI BRAIN STEM W/O & W/DYE: CPT | Mod: 26

## 2021-05-27 NOTE — ED ADULT NURSE NOTE - SUICIDE SCREENING QUESTION 1
PGY3/MD Marci.   VITALS: reviewed  GEN: No apparent distress, A & O x 2  HEAD/EYES: NC/AT, PERRL, EOMI, anicteric sclerae, no conjunctival pallor  ENT: mucus membranes moist, oropharynx WNL, trachea midline, no JVD, neck is supple  RESP: lungs CTA with equal breath sounds bilaterally, chest wall nontender and atraumatic  CV: heart with reg rhythm S1, S2, no murmur; distal pulses intact and symmetric bilaterally  ABDOMEN: normoactive bowel sounds, soft, nondistended, nontender  MSK: extremities atraumatic and nontender, no edema, no asymmetry. + left fore arm, abrasion  SKIN: warm, dry, no rash, no bruising, no cyanosis.  NEURO: alert, mentating appropriately, no facial asymmetry. gross sensation, motor, coordination are intact  PSYCH: Affect appropriate No

## 2021-06-01 ENCOUNTER — APPOINTMENT (OUTPATIENT)
Dept: CARDIOLOGY | Facility: CLINIC | Age: 51
End: 2021-06-01

## 2021-06-01 ENCOUNTER — NON-APPOINTMENT (OUTPATIENT)
Age: 51
End: 2021-06-01

## 2021-06-02 ENCOUNTER — APPOINTMENT (OUTPATIENT)
Dept: CARDIOLOGY | Facility: CLINIC | Age: 51
End: 2021-06-02

## 2021-06-03 ENCOUNTER — TRANSCRIPTION ENCOUNTER (OUTPATIENT)
Age: 51
End: 2021-06-03

## 2021-06-03 ENCOUNTER — NON-APPOINTMENT (OUTPATIENT)
Age: 51
End: 2021-06-03

## 2021-06-03 ENCOUNTER — APPOINTMENT (OUTPATIENT)
Dept: CARDIOLOGY | Facility: CLINIC | Age: 51
End: 2021-06-03
Payer: COMMERCIAL

## 2021-06-03 VITALS
HEART RATE: 55 BPM | DIASTOLIC BLOOD PRESSURE: 83 MMHG | OXYGEN SATURATION: 96 % | SYSTOLIC BLOOD PRESSURE: 138 MMHG | WEIGHT: 188 LBS | HEIGHT: 64.17 IN | BODY MASS INDEX: 32.1 KG/M2

## 2021-06-03 DIAGNOSIS — R94.31 ABNORMAL ELECTROCARDIOGRAM [ECG] [EKG]: ICD-10-CM

## 2021-06-03 DIAGNOSIS — R07.89 OTHER CHEST PAIN: ICD-10-CM

## 2021-06-03 DIAGNOSIS — U07.1 COVID-19: ICD-10-CM

## 2021-06-03 PROCEDURE — 99205 OFFICE O/P NEW HI 60 MIN: CPT

## 2021-06-03 PROCEDURE — 99072 ADDL SUPL MATRL&STAF TM PHE: CPT

## 2021-06-03 PROCEDURE — 93000 ELECTROCARDIOGRAM COMPLETE: CPT

## 2021-06-03 NOTE — HISTORY OF PRESENT ILLNESS
[FreeTextEntry1] : Refugio is a 51-year-old gentleman +FH (father MI 50s), on keppra for meningioma who presents for abnormal ecg. He was in the hospital with COVID last March for two weeks and no one said anything about his ECG  He thinks he got COVID from the gym and afraid to go back. Now doing martial arts twice a week. .He works as a  for the health system.

## 2021-06-03 NOTE — REVIEW OF SYSTEMS
[SOB] : no shortness of breath [Dyspnea on exertion] : not dyspnea during exertion [Chest Discomfort] : chest discomfort [Lower Ext Edema] : no extremity edema [Leg Claudication] : no intermittent leg claudication [Palpitations] : no palpitations [Orthopnea] : no orthopnea [PND] : no PND [Syncope] : no syncope [Negative] : Heme/Lymph

## 2021-06-03 NOTE — DISCUSSION/SUMMARY
[FreeTextEntry1] : The patient is a 51-year-old gentleman +FH CAD, meningioma, s/p COVID with abnormal ECG.\par #1 CV- ECHO, exercise stress test then cardiac CT\par #2 Neuro- stable on keppra\par #3 - low testosterone\par #4 General- received COVID vaccine, encouraged to increase number of days for exercise

## 2021-06-11 ENCOUNTER — NON-APPOINTMENT (OUTPATIENT)
Age: 51
End: 2021-06-11

## 2021-06-11 ENCOUNTER — APPOINTMENT (OUTPATIENT)
Dept: NEUROLOGY | Facility: CLINIC | Age: 51
End: 2021-06-11
Payer: COMMERCIAL

## 2021-06-11 VITALS
DIASTOLIC BLOOD PRESSURE: 91 MMHG | SYSTOLIC BLOOD PRESSURE: 146 MMHG | WEIGHT: 189 LBS | HEIGHT: 64 IN | HEART RATE: 55 BPM | BODY MASS INDEX: 32.27 KG/M2

## 2021-06-11 DIAGNOSIS — Z51.81 ENCOUNTER FOR THERAPEUTIC DRUG LVL MONITORING: ICD-10-CM

## 2021-06-11 PROCEDURE — 99204 OFFICE O/P NEW MOD 45 MIN: CPT

## 2021-06-11 PROCEDURE — 99072 ADDL SUPL MATRL&STAF TM PHE: CPT

## 2021-06-11 RX ORDER — LEVETIRACETAM 1000 MG/1
1000 TABLET, FILM COATED ORAL DAILY
Qty: 90 | Refills: 0 | Status: DISCONTINUED | COMMUNITY
Start: 2021-04-20 | End: 2021-06-11

## 2021-06-14 NOTE — DISCUSSION/SUMMARY
[Medication Side Effects] : High frequency and serious potential medication adverse effects were reviewed with the patient, including but not exclusive to psychiatric effects.  Information sheets on medication side effects were made available to the patient in our clinic.  The patient or advocate agrees to notify us for any concerns. 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 \par Last level 6.8 in AM on 500mg bid, a bit low. Recommend f/u.\par \par Levels for levetiracetam:\par \par Drug Dosage	 Trough (mcg/mL)	 Peak (mcg/mL)\par  500 mg BID	 3.1-10.0	                 10.0-25.0\par  1000 mg BID	 4.9-37.1	                 30.0-40.0\par  1500 mg BID	 7.0-34.0	                 36.1-70.0\par \par F/u MRI head with contrast 2023\par Time 40min\par Plan to discuss level when available.\par RTC 1yr or PRN

## 2021-06-14 NOTE — DATA REVIEWED
[de-identified] : 5/2021 Left frontal encephalomalacia, no enhancement\par 5/2021 C spine C5 C6 spinal stenosis noted.

## 2021-06-14 NOTE — PHYSICAL EXAM
[FreeTextEntry1] : General\par Constitutional: alert and in no acute distress. \par Psychiatric: affect normal, insight and judgment intact.\par Neurologic: \par Orientation: oriented to person, place, and time \par Attention: normal concentrating ability and attention was not decreased. \par Language: no difficulty naming common objects, repeating a phrase, writing a sentence; fluency, comprehension, and reading intact. \par Fund of knowledge: displays adequate knowledge of personal past history. \par Cranial Nerves: visual acuity intact bilaterally, visual fields full to confrontation, pupils equal round and reactive to light, extraocular motion intact, facial sensation intact symmetrically, face symmetrical, hearing was intact bilaterally, tongue and palate midline, head turning and shoulder shrug symmetric and there was no tongue deviation with protrusion. \par Motor: the patient is right hand dominant. \par muscle tone was normal in all four extremities, muscle strength was normal in all four extremities and normal bulk in all four extremities. \par Coordination:. normal gait. balance was intact. there was no past-pointing. no tremor present. \par Deep tendon reflexes: \par Biceps right 2+. Biceps left 3+.  \par Triceps right 2+. Triceps left 2+.  \par Brachioradialis right 2+. Brachioradialis left 2+.  \par Patella right 2+. Patella left 2+.  \par Ankle jerk right 1+. Ankle jerk left 1+. \par Sensation: Right pinky 80% loss of sensation\par Eyes: the sclera and conjunctiva were normal. \par Neck: the appearance of the neck was normal. \par Musculoskeletal: no clubbing or cyanosis of the fingernails. \par Skin: no lesions, rash\par

## 2021-06-14 NOTE — HISTORY OF PRESENT ILLNESS
[FreeTextEntry1] : \par 51 RH M \par had sensory issues in the right arm and leg.\par found meningioma 2011, surgery to remove it 4cm.\par c/b infection, osteomyelitis.\par later cranioplasty that year, with  shunt with seizures.\par \par 2014 off medications x 1-2 yrs, had event of LOC in gym, restarted Rx.\par felt vision tunneling, LOC. amnestic x 1/2hr\par \par LEV 500mg bid  restarted. no seizures since\par \par Denies ADR, mood complaints

## 2021-06-16 ENCOUNTER — APPOINTMENT (OUTPATIENT)
Dept: CT IMAGING | Facility: CLINIC | Age: 51
End: 2021-06-16
Payer: COMMERCIAL

## 2021-06-16 ENCOUNTER — OUTPATIENT (OUTPATIENT)
Dept: OUTPATIENT SERVICES | Facility: HOSPITAL | Age: 51
LOS: 1 days | End: 2021-06-16
Payer: COMMERCIAL

## 2021-06-16 DIAGNOSIS — R07.89 OTHER CHEST PAIN: ICD-10-CM

## 2021-06-16 DIAGNOSIS — Z90.49 ACQUIRED ABSENCE OF OTHER SPECIFIED PARTS OF DIGESTIVE TRACT: Chronic | ICD-10-CM

## 2021-06-16 DIAGNOSIS — Z98.890 OTHER SPECIFIED POSTPROCEDURAL STATES: Chronic | ICD-10-CM

## 2021-06-16 PROCEDURE — 75574 CT ANGIO HRT W/3D IMAGE: CPT | Mod: 26

## 2021-06-16 PROCEDURE — 75574 CT ANGIO HRT W/3D IMAGE: CPT

## 2021-06-22 ENCOUNTER — APPOINTMENT (OUTPATIENT)
Dept: MRI IMAGING | Facility: CLINIC | Age: 51
End: 2021-06-22
Payer: COMMERCIAL

## 2021-06-22 ENCOUNTER — OUTPATIENT (OUTPATIENT)
Dept: OUTPATIENT SERVICES | Facility: HOSPITAL | Age: 51
LOS: 1 days | End: 2021-06-22
Payer: COMMERCIAL

## 2021-06-22 DIAGNOSIS — Z90.49 ACQUIRED ABSENCE OF OTHER SPECIFIED PARTS OF DIGESTIVE TRACT: Chronic | ICD-10-CM

## 2021-06-22 DIAGNOSIS — Z00.8 ENCOUNTER FOR OTHER GENERAL EXAMINATION: ICD-10-CM

## 2021-06-22 DIAGNOSIS — Z98.890 OTHER SPECIFIED POSTPROCEDURAL STATES: Chronic | ICD-10-CM

## 2021-06-22 PROCEDURE — A9585: CPT

## 2021-06-22 PROCEDURE — 74183 MRI ABD W/O CNTR FLWD CNTR: CPT | Mod: 26

## 2021-06-22 PROCEDURE — 74183 MRI ABD W/O CNTR FLWD CNTR: CPT

## 2021-07-02 ENCOUNTER — APPOINTMENT (OUTPATIENT)
Dept: CV DIAGNOSITCS | Facility: HOSPITAL | Age: 51
End: 2021-07-02

## 2021-07-02 ENCOUNTER — OUTPATIENT (OUTPATIENT)
Dept: OUTPATIENT SERVICES | Facility: HOSPITAL | Age: 51
LOS: 1 days | End: 2021-07-02
Payer: COMMERCIAL

## 2021-07-02 ENCOUNTER — APPOINTMENT (OUTPATIENT)
Dept: CV DIAGNOSTICS | Facility: HOSPITAL | Age: 51
End: 2021-07-02

## 2021-07-02 DIAGNOSIS — Z98.890 OTHER SPECIFIED POSTPROCEDURAL STATES: Chronic | ICD-10-CM

## 2021-07-02 DIAGNOSIS — R94.31 ABNORMAL ELECTROCARDIOGRAM [ECG] [EKG]: ICD-10-CM

## 2021-07-02 DIAGNOSIS — Z90.49 ACQUIRED ABSENCE OF OTHER SPECIFIED PARTS OF DIGESTIVE TRACT: Chronic | ICD-10-CM

## 2021-07-02 PROCEDURE — 93016 CV STRESS TEST SUPVJ ONLY: CPT

## 2021-07-02 PROCEDURE — 93306 TTE W/DOPPLER COMPLETE: CPT | Mod: 26

## 2021-07-02 PROCEDURE — 93306 TTE W/DOPPLER COMPLETE: CPT

## 2021-07-02 PROCEDURE — 93018 CV STRESS TEST I&R ONLY: CPT

## 2021-07-02 PROCEDURE — 93017 CV STRESS TEST TRACING ONLY: CPT

## 2021-08-09 NOTE — H&P ADULT - NSHPPOADEEPVENOUSTHROMB_GEN_A_CORE
no Principal Discharge DX:	 (normal spontaneous vaginal delivery)  Goal:	recovery  Assessment and plan of treatment:	reg diet, ambulating, pain control

## 2021-10-10 ENCOUNTER — TRANSCRIPTION ENCOUNTER (OUTPATIENT)
Age: 51
End: 2021-10-10

## 2021-11-20 ENCOUNTER — RX RENEWAL (OUTPATIENT)
Age: 51
End: 2021-11-20

## 2022-03-25 ENCOUNTER — APPOINTMENT (OUTPATIENT)
Dept: INTERNAL MEDICINE | Facility: CLINIC | Age: 52
End: 2022-03-25
Payer: COMMERCIAL

## 2022-03-25 VITALS
HEIGHT: 64 IN | HEART RATE: 52 BPM | WEIGHT: 185 LBS | SYSTOLIC BLOOD PRESSURE: 129 MMHG | DIASTOLIC BLOOD PRESSURE: 84 MMHG | TEMPERATURE: 98 F | OXYGEN SATURATION: 99 % | BODY MASS INDEX: 31.58 KG/M2

## 2022-03-25 DIAGNOSIS — R73.03 PREDIABETES.: ICD-10-CM

## 2022-03-25 DIAGNOSIS — J45.901 UNSPECIFIED ASTHMA WITH (ACUTE) EXACERBATION: ICD-10-CM

## 2022-03-25 PROCEDURE — 99214 OFFICE O/P EST MOD 30 MIN: CPT | Mod: 25

## 2022-03-25 PROCEDURE — 36415 COLL VENOUS BLD VENIPUNCTURE: CPT

## 2022-03-25 RX ORDER — MELOXICAM 15 MG/1
15 TABLET ORAL
Qty: 30 | Refills: 3 | Status: DISCONTINUED | COMMUNITY
Start: 2020-05-18 | End: 2022-03-25

## 2022-03-25 RX ORDER — PREDNISONE 20 MG/1
20 TABLET ORAL DAILY
Qty: 10 | Refills: 0 | Status: DISCONTINUED | COMMUNITY
Start: 2021-12-21 | End: 2022-03-25

## 2022-03-25 NOTE — ADDENDUM
[FreeTextEntry1] : I, Cristino Crooks, acted as a scribe on behalf of Dr. Evgeny Verdin MD, on 03/25/2022. \par \par All medical entries made by the scribe were at my, Dr. Evgeny Verdin MD, direction and personally dictated by me on 03/25/2022. I have reviewed the chart and agree that the record accurately reflects my personal performance of the history, physical exam, assessment and plan. I have also personally directed, reviewed, and agreed with the chart.

## 2022-03-25 NOTE — ASSESSMENT
[FreeTextEntry1] : -Blood work done today\par -Asthma is currently stable\par -Will check Keppra level for seizures\par -Check A1c for hx of pre-diabetes \par -Check PSA levels hx of BPH, currently not bothersome\par -F/u with endocrinology for testosterone\par

## 2022-03-25 NOTE — HISTORY OF PRESENT ILLNESS
[FreeTextEntry1] : Patient presents for follow-up. [de-identified] : Patient is doing well overall. \par Pt saw Cardiology and evaluated with different kind of heart beat. He did a stress test and everything was okay.\par Denies any CP, Chest tightness, SOB, LE edema, any urinary symptom or change in bowel habits.\par Denies fever, night sweats, chills\par Denies anxiety or depression\par Asthma and Seizures are stable.\par Pt reports of attempting to watch his diet. Denies fatigue and poor libido.\par Also notes of Cataract surgery 1 year ago. Recovered well.\par UTD with Covid-19 vaccine\par FHx: Father: thyroid cancer

## 2022-03-25 NOTE — PHYSICAL EXAM
[No Acute Distress] : no acute distress [Well Nourished] : well nourished [Well Developed] : well developed [Well-Appearing] : well-appearing [Normal Sclera/Conjunctiva] : normal sclera/conjunctiva [PERRL] : pupils equal round and reactive to light [EOMI] : extraocular movements intact [Normal Outer Ear/Nose] : the outer ears and nose were normal in appearance [Normal Oropharynx] : the oropharynx was normal [No JVD] : no jugular venous distention [No Lymphadenopathy] : no lymphadenopathy [Supple] : supple [Thyroid Normal, No Nodules] : the thyroid was normal and there were no nodules present [No Respiratory Distress] : no respiratory distress  [No Accessory Muscle Use] : no accessory muscle use [Clear to Auscultation] : lungs were clear to auscultation bilaterally [Normal Rate] : normal rate  [Regular Rhythm] : with a regular rhythm [Normal S1, S2] : normal S1 and S2 [No Murmur] : no murmur heard [No Carotid Bruits] : no carotid bruits [No Abdominal Bruit] : a ~M bruit was not heard ~T in the abdomen [No Varicosities] : no varicosities [Pedal Pulses Present] : the pedal pulses are present [No Edema] : there was no peripheral edema [No Palpable Aorta] : no palpable aorta [No Extremity Clubbing/Cyanosis] : no extremity clubbing/cyanosis [Soft] : abdomen soft [Non Tender] : non-tender [Non-distended] : non-distended [No Masses] : no abdominal mass palpated [No HSM] : no HSM [Normal Bowel Sounds] : normal bowel sounds [Normal Posterior Cervical Nodes] : no posterior cervical lymphadenopathy [Normal Anterior Cervical Nodes] : no anterior cervical lymphadenopathy [No CVA Tenderness] : no CVA  tenderness [No Spinal Tenderness] : no spinal tenderness [No Joint Swelling] : no joint swelling [Grossly Normal Strength/Tone] : grossly normal strength/tone [No Rash] : no rash [Coordination Grossly Intact] : coordination grossly intact [No Focal Deficits] : no focal deficits [Normal Gait] : normal gait [Deep Tendon Reflexes (DTR)] : deep tendon reflexes were 2+ and symmetric [Normal Affect] : the affect was normal [Normal Insight/Judgement] : insight and judgment were intact [de-identified] : Enlarged prostate

## 2022-03-30 DIAGNOSIS — R79.89 OTHER SPECIFIED ABNORMAL FINDINGS OF BLOOD CHEMISTRY: ICD-10-CM

## 2022-03-30 LAB
25(OH)D3 SERPL-MCNC: 15.5 NG/ML
ALBUMIN SERPL ELPH-MCNC: 4.5 G/DL
ALP BLD-CCNC: 80 U/L
ALT SERPL-CCNC: 20 U/L
ANION GAP SERPL CALC-SCNC: 12 MMOL/L
APPEARANCE: CLEAR
APPEARANCE: CLEAR
AST SERPL-CCNC: 31 U/L
BACTERIA: NEGATIVE
BASOPHILS # BLD AUTO: 0.05 K/UL
BASOPHILS NFR BLD AUTO: 0.9 %
BILIRUB SERPL-MCNC: 1 MG/DL
BILIRUBIN URINE: NEGATIVE
BILIRUBIN URINE: NEGATIVE
BLOOD URINE: NEGATIVE
BLOOD URINE: NEGATIVE
BUN SERPL-MCNC: 17 MG/DL
CALCIUM SERPL-MCNC: 9.4 MG/DL
CHLORIDE SERPL-SCNC: 106 MMOL/L
CHOLEST SERPL-MCNC: 218 MG/DL
CO2 SERPL-SCNC: 22 MMOL/L
COLOR: YELLOW
COLOR: YELLOW
CREAT SERPL-MCNC: 0.86 MG/DL
EGFR: 104 ML/MIN/1.73M2
EOSINOPHIL # BLD AUTO: 0.11 K/UL
EOSINOPHIL NFR BLD AUTO: 2 %
ESTIMATED AVERAGE GLUCOSE: 123 MG/DL
GLUCOSE QUALITATIVE U: NEGATIVE
GLUCOSE QUALITATIVE U: NEGATIVE
GLUCOSE SERPL-MCNC: 97 MG/DL
HBA1C MFR BLD HPLC: 5.9 %
HCT VFR BLD CALC: 42.3 %
HDLC SERPL-MCNC: 36 MG/DL
HGB BLD-MCNC: 14.3 G/DL
HYALINE CASTS: 0 /LPF
IMM GRANULOCYTES NFR BLD AUTO: 0.4 %
KETONES URINE: NEGATIVE
KETONES URINE: NEGATIVE
LDLC SERPL CALC-MCNC: 144 MG/DL
LEUKOCYTE ESTERASE URINE: NEGATIVE
LEUKOCYTE ESTERASE URINE: NEGATIVE
LYMPHOCYTES # BLD AUTO: 1.57 K/UL
LYMPHOCYTES NFR BLD AUTO: 28.8 %
MAN DIFF?: NORMAL
MCHC RBC-ENTMCNC: 30.2 PG
MCHC RBC-ENTMCNC: 33.8 GM/DL
MCV RBC AUTO: 89.2 FL
MICROSCOPIC-UA: NORMAL
MONOCYTES # BLD AUTO: 0.4 K/UL
MONOCYTES NFR BLD AUTO: 7.3 %
NEUTROPHILS # BLD AUTO: 3.3 K/UL
NEUTROPHILS NFR BLD AUTO: 60.6 %
NITRITE URINE: NEGATIVE
NITRITE URINE: NEGATIVE
NONHDLC SERPL-MCNC: 183 MG/DL
PH URINE: 6
PH URINE: 6
PLATELET # BLD AUTO: 229 K/UL
POTASSIUM SERPL-SCNC: 4.2 MMOL/L
PROT SERPL-MCNC: 7.2 G/DL
PROTEIN URINE: NORMAL
PROTEIN URINE: NORMAL
PSA SERPL-MCNC: 1.99 NG/ML
RBC # BLD: 4.74 M/UL
RBC # FLD: 12.1 %
RED BLOOD CELLS URINE: 1 /HPF
SODIUM SERPL-SCNC: 140 MMOL/L
SPECIFIC GRAVITY URINE: 1.03
SPECIFIC GRAVITY URINE: 1.03
SQUAMOUS EPITHELIAL CELLS: 0 /HPF
TESTOST FREE SERPL-MCNC: 11.8 PG/ML
TESTOST SERPL-MCNC: 202 NG/DL
TRIGL SERPL-MCNC: 195 MG/DL
TSH SERPL-ACNC: 1.14 UIU/ML
UROBILINOGEN URINE: NORMAL
UROBILINOGEN URINE: NORMAL
VIT B12 SERPL-MCNC: 524 PG/ML
WBC # FLD AUTO: 5.45 K/UL
WHITE BLOOD CELLS URINE: 0 /HPF

## 2022-04-01 LAB — LEVETIRACETAM SERPL-MCNC: 17.6 UG/ML

## 2022-06-08 ENCOUNTER — NON-APPOINTMENT (OUTPATIENT)
Age: 52
End: 2022-06-08

## 2022-06-09 ENCOUNTER — APPOINTMENT (OUTPATIENT)
Dept: ENDOCRINOLOGY | Facility: CLINIC | Age: 52
End: 2022-06-09
Payer: COMMERCIAL

## 2022-06-09 ENCOUNTER — NON-APPOINTMENT (OUTPATIENT)
Age: 52
End: 2022-06-09

## 2022-06-09 VITALS
SYSTOLIC BLOOD PRESSURE: 125 MMHG | HEART RATE: 57 BPM | TEMPERATURE: 96.4 F | HEIGHT: 63.7 IN | BODY MASS INDEX: 33.01 KG/M2 | WEIGHT: 191 LBS | DIASTOLIC BLOOD PRESSURE: 78 MMHG | OXYGEN SATURATION: 97 %

## 2022-06-09 DIAGNOSIS — R79.89 OTHER SPECIFIED ABNORMAL FINDINGS OF BLOOD CHEMISTRY: ICD-10-CM

## 2022-06-09 PROCEDURE — 36415 COLL VENOUS BLD VENIPUNCTURE: CPT

## 2022-06-09 PROCEDURE — 99212 OFFICE O/P EST SF 10 MIN: CPT | Mod: 25

## 2022-06-17 ENCOUNTER — NON-APPOINTMENT (OUTPATIENT)
Age: 52
End: 2022-06-17

## 2022-06-17 ENCOUNTER — APPOINTMENT (OUTPATIENT)
Dept: DERMATOLOGY | Facility: CLINIC | Age: 52
End: 2022-06-17
Payer: COMMERCIAL

## 2022-06-17 DIAGNOSIS — Z12.83 ENCOUNTER FOR SCREENING FOR MALIGNANT NEOPLASM OF SKIN: ICD-10-CM

## 2022-06-17 DIAGNOSIS — D22.9 MELANOCYTIC NEVI, UNSPECIFIED: ICD-10-CM

## 2022-06-17 DIAGNOSIS — D18.01 HEMANGIOMA OF SKIN AND SUBCUTANEOUS TISSUE: ICD-10-CM

## 2022-06-17 PROBLEM — R79.89 LOW TESTOSTERONE LEVEL IN MALE: Status: ACTIVE | Noted: 2021-04-20

## 2022-06-17 LAB
CORTIS SERPL-MCNC: 9.1 UG/DL
FSH SERPL-MCNC: 3 IU/L
IGF-1 INTERP: NORMAL
IGF-I BLD-MCNC: 173 NG/ML
LH SERPL-ACNC: 4.4 IU/L
PROLACTIN SERPL-MCNC: 12.1 NG/ML
T4 FREE SERPL-MCNC: 1.2 NG/DL
TESTOST FREE SERPL-MCNC: 10.5 PG/ML
TESTOST SERPL-MCNC: 230 NG/DL

## 2022-06-17 PROCEDURE — 99203 OFFICE O/P NEW LOW 30 MIN: CPT

## 2022-06-17 NOTE — HISTORY OF PRESENT ILLNESS
[FreeTextEntry1] : CC: Low testosterone\par This is a 53-year-old male with low testosterone, prediabetes, hyperlipidemia, vitamin D insufficiency, obesity, meningioma, here for follow-up.  \par Blood work from May 2, 2021 showed total testosterone 234.7.  He is not currently on testosterone replacement.\par He reports weight gain and erectile dysfunction. \par MRI pituitary from May 2021 showed no pituitary adenoma, pituitary gland small for patient's age.\par \par

## 2022-06-17 NOTE — ASSESSMENT
[FreeTextEntry1] : This is a 52-year-old male with low testosterone, prediabetes, hyperlipidemia, vitamin D insufficiency, obesity, meningioma, here for follow-up.\par He reports erectile dysfunction and weight gain.\par MRI pituitary from May 2021 showed no pituitary adenoma, pituitary gland small for age.\par Check repeat testosterone level.  Check TSH and prolactin.\par Further management pending results.

## 2022-06-17 NOTE — REVIEW OF SYSTEMS
[Recent Weight Gain (___ Lbs)] : recent weight gain: [unfilled] lbs [Erectile Dysfunction] : erectile dysfunction [All other systems negative] : All other systems negative

## 2022-06-17 NOTE — PHYSICAL EXAM
[Alert] : alert [Well Nourished] : well nourished [Healthy Appearance] : healthy appearance [Obese] : obese [No Acute Distress] : no acute distress [Well Developed] : well developed [Normal Voice/Communication] : normal voice communication [Normal Sclera/Conjunctiva] : normal sclera/conjunctiva [No Proptosis] : no proptosis [No Neck Mass] : no neck mass was observed [No LAD] : no lymphadenopathy [Supple] : the neck was supple [Thyroid Not Enlarged] : the thyroid was not enlarged [No Thyroid Nodules] : no palpable thyroid nodules [No Respiratory Distress] : no respiratory distress [No Stigmata of Cushings Syndrome] : no stigmata of Cushings Syndrome [Normal Gait] : normal gait [No Clubbing, Cyanosis] : no clubbing  or cyanosis of the fingernails [No Involuntary Movements] : no involuntary movements were seen [Acanthosis Nigricans] : no acanthosis nigricans [No Tremors] : no tremors [Normal Affect] : the affect was normal [Normal Insight/Judgement] : insight and judgment were intact [Normal Mood] : the mood was normal

## 2022-06-24 ENCOUNTER — APPOINTMENT (OUTPATIENT)
Dept: ORTHOPEDIC SURGERY | Facility: CLINIC | Age: 52
End: 2022-06-24

## 2022-07-01 ENCOUNTER — APPOINTMENT (OUTPATIENT)
Dept: ORTHOPEDIC SURGERY | Facility: CLINIC | Age: 52
End: 2022-07-01
Payer: COMMERCIAL

## 2022-07-01 VITALS
HEIGHT: 63.7 IN | WEIGHT: 186 LBS | DIASTOLIC BLOOD PRESSURE: 85 MMHG | SYSTOLIC BLOOD PRESSURE: 137 MMHG | BODY MASS INDEX: 32.15 KG/M2 | HEART RATE: 88 BPM

## 2022-07-01 DIAGNOSIS — G57.01 LESION OF SCIATIC NERVE, RIGHT LOWER LIMB: ICD-10-CM

## 2022-07-01 DIAGNOSIS — M25.561 PAIN IN RIGHT KNEE: ICD-10-CM

## 2022-07-01 DIAGNOSIS — M54.16 RADICULOPATHY, LUMBAR REGION: ICD-10-CM

## 2022-07-01 PROCEDURE — 72100 X-RAY EXAM L-S SPINE 2/3 VWS: CPT

## 2022-07-01 PROCEDURE — 73501 X-RAY EXAM HIP UNI 1 VIEW: CPT

## 2022-07-01 PROCEDURE — 99213 OFFICE O/P EST LOW 20 MIN: CPT

## 2022-07-01 PROCEDURE — 72170 X-RAY EXAM OF PELVIS: CPT | Mod: RT

## 2022-08-13 ENCOUNTER — NON-APPOINTMENT (OUTPATIENT)
Age: 52
End: 2022-08-13

## 2022-10-25 ENCOUNTER — RX RENEWAL (OUTPATIENT)
Age: 52
End: 2022-10-25

## 2022-11-15 ENCOUNTER — APPOINTMENT (OUTPATIENT)
Dept: UROLOGY | Facility: CLINIC | Age: 52
End: 2022-11-15

## 2022-11-15 VITALS
HEIGHT: 63 IN | DIASTOLIC BLOOD PRESSURE: 82 MMHG | SYSTOLIC BLOOD PRESSURE: 179 MMHG | WEIGHT: 189 LBS | HEART RATE: 92 BPM | BODY MASS INDEX: 33.49 KG/M2

## 2022-11-15 PROCEDURE — 99204 OFFICE O/P NEW MOD 45 MIN: CPT

## 2022-11-16 ENCOUNTER — TRANSCRIPTION ENCOUNTER (OUTPATIENT)
Age: 52
End: 2022-11-16

## 2022-11-16 DIAGNOSIS — D32.9 BENIGN NEOPLASM OF MENINGES, UNSPECIFIED: ICD-10-CM

## 2022-11-18 LAB
APPEARANCE: CLEAR
BACTERIA: NEGATIVE
BILIRUBIN URINE: NEGATIVE
BLOOD URINE: NEGATIVE
COLOR: NORMAL
GLUCOSE QUALITATIVE U: NEGATIVE
HYALINE CASTS: 0 /LPF
KETONES URINE: NEGATIVE
LEUKOCYTE ESTERASE URINE: NEGATIVE
MICROSCOPIC-UA: NORMAL
NITRITE URINE: NEGATIVE
PH URINE: 6.5
PROTEIN URINE: NORMAL
RED BLOOD CELLS URINE: 1 /HPF
SPECIFIC GRAVITY URINE: 1.02
SQUAMOUS EPITHELIAL CELLS: 0 /HPF
UROBILINOGEN URINE: NORMAL
WHITE BLOOD CELLS URINE: 0 /HPF

## 2022-11-28 NOTE — HISTORY OF PRESENT ILLNESS
[FreeTextEntry1] : cc bph\par pt is  yo male referred for bph . The patient reports frequency,slow flow noctuira. He deniesincomplete emptying, intermittency, straining, urgency and weak stream. hematuria and dysuria  The patient states symptoms are of  modseverity. The symptoms have been present for years . He reports a history of no complicating symptoms. He denies flank pain, gross hematuria, kidney stones and recurrent UTI.\par h/o ed takes tadalafil good results\par  Prior treatments include . Results of treatment are good\par last psa 1.99 3/22\par The is no family history of prostate cancer\par

## 2022-12-05 ENCOUNTER — TRANSCRIPTION ENCOUNTER (OUTPATIENT)
Age: 52
End: 2022-12-05

## 2022-12-15 NOTE — PROGRESS NOTE ADULT - REASON FOR ADMISSION
Start a daily fiber supplement such as Citrucel or Metamucil. Start with once a day and slowly increase up to three times a day, if needed, over the next 4-6 weeks  Colonoscopy  Return to clinic if bleeding continues after one month for hemorrhoid banding   Dyspnea, COVID r/o

## 2023-01-05 DIAGNOSIS — T14.8XXA OTHER INJURY OF UNSPECIFIED BODY REGION, INITIAL ENCOUNTER: ICD-10-CM

## 2023-01-05 RX ORDER — PENCICLOVIR 10 MG/G
1 CREAM TOPICAL
Qty: 1 | Refills: 1 | Status: ACTIVE | COMMUNITY
Start: 2023-01-05 | End: 1900-01-01

## 2023-01-06 ENCOUNTER — TRANSCRIPTION ENCOUNTER (OUTPATIENT)
Age: 53
End: 2023-01-06

## 2023-01-10 ENCOUNTER — APPOINTMENT (OUTPATIENT)
Dept: MRI IMAGING | Facility: CLINIC | Age: 53
End: 2023-01-10
Payer: COMMERCIAL

## 2023-01-10 ENCOUNTER — OUTPATIENT (OUTPATIENT)
Dept: OUTPATIENT SERVICES | Facility: HOSPITAL | Age: 53
LOS: 1 days | End: 2023-01-10
Payer: COMMERCIAL

## 2023-01-10 DIAGNOSIS — K76.89 OTHER SPECIFIED DISEASES OF LIVER: ICD-10-CM

## 2023-01-10 DIAGNOSIS — Z90.49 ACQUIRED ABSENCE OF OTHER SPECIFIED PARTS OF DIGESTIVE TRACT: Chronic | ICD-10-CM

## 2023-01-10 DIAGNOSIS — Z98.890 OTHER SPECIFIED POSTPROCEDURAL STATES: Chronic | ICD-10-CM

## 2023-01-10 PROCEDURE — 70553 MRI BRAIN STEM W/O & W/DYE: CPT | Mod: 26

## 2023-01-10 PROCEDURE — 70553 MRI BRAIN STEM W/O & W/DYE: CPT

## 2023-01-10 PROCEDURE — 74183 MRI ABD W/O CNTR FLWD CNTR: CPT

## 2023-01-10 PROCEDURE — 74183 MRI ABD W/O CNTR FLWD CNTR: CPT | Mod: 26

## 2023-01-10 PROCEDURE — A9585: CPT

## 2023-01-13 ENCOUNTER — TRANSCRIPTION ENCOUNTER (OUTPATIENT)
Age: 53
End: 2023-01-13

## 2023-01-17 ENCOUNTER — TRANSCRIPTION ENCOUNTER (OUTPATIENT)
Age: 53
End: 2023-01-17

## 2023-02-23 NOTE — PROGRESS NOTE ADULT - PROBLEM SELECTOR PLAN 7
Transitions of Care Status:  1.  Name of PCP:  2.  PCP Contacted on Admission: [ ] Y    [ ] N    3.  PCP contacted at Discharge: [ ] Y    [ ] N    [ ] N/A  4.  Post-Discharge Appointment Date and Location:  5.  Summary of Handoff given to PCP: Billing Type: Third-Party Bill

## 2023-03-28 ENCOUNTER — RX RENEWAL (OUTPATIENT)
Age: 53
End: 2023-03-28

## 2023-03-30 ENCOUNTER — NON-APPOINTMENT (OUTPATIENT)
Age: 53
End: 2023-03-30

## 2023-03-30 ENCOUNTER — APPOINTMENT (OUTPATIENT)
Dept: OTOLARYNGOLOGY | Facility: CLINIC | Age: 53
End: 2023-03-30
Payer: COMMERCIAL

## 2023-03-30 VITALS
HEART RATE: 67 BPM | DIASTOLIC BLOOD PRESSURE: 76 MMHG | HEIGHT: 64 IN | TEMPERATURE: 97.8 F | SYSTOLIC BLOOD PRESSURE: 115 MMHG | BODY MASS INDEX: 31.58 KG/M2 | WEIGHT: 185 LBS

## 2023-03-30 DIAGNOSIS — R09.81 NASAL CONGESTION: ICD-10-CM

## 2023-03-30 DIAGNOSIS — R06.81 APNEA, NOT ELSEWHERE CLASSIFIED: ICD-10-CM

## 2023-03-30 DIAGNOSIS — J30.1 ALLERGIC RHINITIS DUE TO POLLEN: ICD-10-CM

## 2023-03-30 DIAGNOSIS — R06.83 SNORING: ICD-10-CM

## 2023-03-30 DIAGNOSIS — J34.89 OTHER SPECIFIED DISORDERS OF NOSE AND NASAL SINUSES: ICD-10-CM

## 2023-03-30 DIAGNOSIS — J32.4 CHRONIC PANSINUSITIS: ICD-10-CM

## 2023-03-30 PROCEDURE — 99204 OFFICE O/P NEW MOD 45 MIN: CPT | Mod: 25

## 2023-03-30 PROCEDURE — 31231 NASAL ENDOSCOPY DX: CPT

## 2023-03-30 PROCEDURE — 95004 PERQ TESTS W/ALRGNC XTRCS: CPT

## 2023-03-30 RX ORDER — AZELASTINE HYDROCHLORIDE 137 UG/1
137 SPRAY, METERED NASAL TWICE DAILY
Qty: 3 | Refills: 0 | Status: ACTIVE | COMMUNITY
Start: 2023-03-30 | End: 1900-01-01

## 2023-03-30 RX ORDER — FLUTICASONE PROPIONATE 50 UG/1
50 SPRAY, METERED NASAL TWICE DAILY
Qty: 1 | Refills: 3 | Status: ACTIVE | COMMUNITY
Start: 2023-03-30 | End: 1900-01-01

## 2023-03-30 NOTE — PHYSICAL EXAM
[Normal] : mucosa is normal [Midline] : trachea located in midline position [de-identified] : + deacon

## 2023-03-30 NOTE — END OF VISIT
[FreeTextEntry3] : I personally saw and examined the patient in detail. I spoke to YAMILA Santiago regarding the assessment and plan of care.  I preformed the procedures and I reviewed the above assessment and plan of care, and agree. I have made changes in changes in the body of the note where appropriate.

## 2023-03-30 NOTE — PROCEDURE
[FreeTextEntry6] : Procedure performed: Nasal Endoscopy- Diagnostic\par Pre-op indication(s): nasal congestion\par Post-op indication(s): nasal congestion\par Verbal and/or written consent obtained from patient\par Anterior rhinoscopy insufficient to account for symptoms\par Scope #: 3, flexible fiber optic telescope\par The scope was introduced in the nasal passage between the middle and inferior turbinates to exam the inferior portion of the middle meatus and the fontanelle, as well as the maxillary ostia. Next, the scope was passed medically and posteriorly to the middle turbinates to examine the sphenoethmoid recess and the superior turbinate region.\par \par Upon visualization the finders are as follows:\par Nasal Septum: sigmoidal septal deviation\par Bilateral - Mucosa: boggy turbinates, Mucous: scant, Polyp: not seen, Inferior Turbinate: boggy, Middle Turbinate: normal, Superior Turbinate: normal, Inferior Meatus: narrow, Middle Meatus: narrow, Super Meatus: normal, Sphenoethmoidal Recess: clear\par \par full BOT, spur on left side,  significant left global nasal deviation,\par narrow valve, caudal deflection on left side [de-identified] : Procedure performed: laryngeal Endoscopy- Diagnostic\par Pre-op/post op indication: dysphonia\par Verbal and/or written consent obtained from patient, Patient was unable to cooperate with mirror\par Scope #: 3, flexible fiber optic telescope used\par \par Scope was introduced through the nose passed on the floor of the nose to the nasopharynx and then followed down the soft palate to the lower pharynx. The tongue Base, Larynx, Hypopharynx were examined. Base of tongue was symmetric, vallecular was clear, epiglottis was not deformed, subglottis/ pyriform and posterior pharyngeal walls were clear. No erythema, edema, pooling of secretions, masses or lesions. Airway patent, no foreign body visualized. Postcricoid area with moderate erythema and mild edema. + intra-artenoid bar. No pooling of secretions. True vocal cords, vestibular folds, ventricles, pyriform sinuses, and aryepiglottic folds appear normal bilaterally. Vocal cords mobile with good contact b/l.\par

## 2023-03-30 NOTE — ASSESSMENT
[FreeTextEntry1] : 52 year old male with nasal obstruction. On exam, spur on left side, significant left global nasal deviation,\par narrow valve, caudal deflection on left side. \par Nasal congestion with narrow valve, global nasal deviation and bilateral turbinate hypertrophy. Will start regiment to see if may improve symptoms and escalate if needed.\par - Will start Flonase. A topical steroid reduce mucosal swelling, illustrated appropriate use and how to reduce the risk of bleeding\par - Nasal irrigation and showed how to use it to maximize effectiveness\par Risks benefits and alternatives to septoplasty bilateral inferior turbinate reduction discussed. risks of bleeding, infection, septal hematoma, injury to the skull base, septal perforation results in whistling, empty nose syndrome, altered sensation in nose and top of mouth, crusting and bleeding as well as continued nasal obstruction were discussed. Patient understood risks \par \par pt with snoring. palate OK On exam, full BOT. \par -discussed allergy avoidance techniques and nasal saline rinses \par -breathe right strip as needed\par -can consider wt loss or dental appliance.\par -Allergy test performed. Counseled patient at length on pathophysiology all allergies and techniques for avoidance. handouts given. Mold \par sleep study \par

## 2023-03-30 NOTE — HISTORY OF PRESENT ILLNESS
[de-identified] : 52 year old male presents with nasal congestion and obstruction. Patient states he is always stuffy and can not breath out of his nose. He is active and during martial arts can not use mouthguards as he cant get enough air in. Denies sinus pressure, or sinus infections. Patient is allergic to dust and pollen. Patient has not been allergy tested since he was a child. Denies runny nose. \par \par Also complains of snoring. Wife states he has occasional pauses in breathing at night. Also has daytime sleepiness. Has not tried breath right strips in the past. \par Father was diagnosed with sleep apnea.

## 2023-03-30 NOTE — CONSULT LETTER
[Please see my note below.] : Please see my note below. [FreeTextEntry1] : Dear Dr. CAITY DENNISON \par I had the pleasure of evaluating your patient CHRISTA SIMPSON, thank you for allowing us to participate in their care. please see full note detailing our visit below.\par If you have any questions, please do not hesitate to call me and I would be happy to discuss further. \par \par Elfego Zafar M.D.\par Attending Physician,  \par Department of Otolaryngology - Head and Neck Surgery\par [facility] \par Office: [phone]\par Fax: [phone]\par \par

## 2023-04-18 ENCOUNTER — TRANSCRIPTION ENCOUNTER (OUTPATIENT)
Age: 53
End: 2023-04-18

## 2023-05-19 ENCOUNTER — APPOINTMENT (OUTPATIENT)
Dept: NEUROLOGY | Facility: CLINIC | Age: 53
End: 2023-05-19
Payer: COMMERCIAL

## 2023-05-19 VITALS
WEIGHT: 185 LBS | HEIGHT: 64 IN | SYSTOLIC BLOOD PRESSURE: 134 MMHG | DIASTOLIC BLOOD PRESSURE: 86 MMHG | HEART RATE: 61 BPM | BODY MASS INDEX: 31.58 KG/M2

## 2023-05-19 PROCEDURE — 99204 OFFICE O/P NEW MOD 45 MIN: CPT

## 2023-05-19 NOTE — DATA REVIEWED
[de-identified] : 5/2021 Left frontal encephalomalacia, no enhancement\par 5/2021 C spine C5 C6 spinal stenosis noted.

## 2023-05-19 NOTE — DISCUSSION/SUMMARY
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 \par Last level 6.8-17.6 in AM on 500mg bid, a bit low. Recommend f/u.\par \par Levels for levetiracetam:\par \par Drug Dosage	 Trough (mcg/mL)	 Peak (mcg/mL)\par  500 mg BID	 3.1-10.0	                 10.0-25.0\par  1000 mg BID	 4.9-37.1	                 30.0-40.0\par  1500 mg BID	 7.0-34.0	                 36.1-70.0\par \par F/u MRI head with contrast 2023\par Time 250min\par Plan to discuss level when available.\par RTC 1yr or PRN [Medication Side Effects] : High frequency and serious potential medication adverse effects were reviewed with the patient, including but not exclusive to psychiatric effects.  Information sheets on medication side effects were made available to the patient in our clinic.  The patient or advocate agrees to notify us for any concerns.

## 2023-05-19 NOTE — HISTORY OF PRESENT ILLNESS
[FreeTextEntry1] : Updates:  last seen 6/2021.  in interval no seizures.\par on LEV, no ADR\par mood stable\par \par HPI: 53 RH M  had sensory issues in the right arm and leg.\par found meningioma 2011, surgery to remove it 4cm.\par c/b infection, osteomyelitis.\par later cranioplasty that year, with  shunt with seizures.\par \par 2014 off medications x 1-2 yrs, had event of LOC in gym, restarted Rx.\par felt vision tunneling, LOC. amnestic x 1/2hr\par LEV 500mg bid  restarted. no seizures since\par \par Denies ADR, mood complaints

## 2023-05-24 ENCOUNTER — APPOINTMENT (OUTPATIENT)
Dept: RADIOLOGY | Facility: CLINIC | Age: 53
End: 2023-05-24

## 2023-05-24 ENCOUNTER — RESULT REVIEW (OUTPATIENT)
Age: 53
End: 2023-05-24

## 2023-05-24 ENCOUNTER — TRANSCRIPTION ENCOUNTER (OUTPATIENT)
Age: 53
End: 2023-05-24

## 2023-05-24 ENCOUNTER — OUTPATIENT (OUTPATIENT)
Dept: OUTPATIENT SERVICES | Facility: HOSPITAL | Age: 53
LOS: 1 days | End: 2023-05-24
Payer: COMMERCIAL
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
## 2023-05-24 DIAGNOSIS — S29.9XXA UNSPECIFIED INJURY OF THORAX, INITIAL ENCOUNTER: ICD-10-CM

## 2023-05-24 DIAGNOSIS — Z90.49 ACQUIRED ABSENCE OF OTHER SPECIFIED PARTS OF DIGESTIVE TRACT: Chronic | ICD-10-CM

## 2023-05-24 DIAGNOSIS — Z98.890 OTHER SPECIFIED POSTPROCEDURAL STATES: Chronic | ICD-10-CM

## 2023-05-24 PROCEDURE — 71046 X-RAY EXAM CHEST 2 VIEWS: CPT | Mod: 26

## 2023-05-24 PROCEDURE — 71046 X-RAY EXAM CHEST 2 VIEWS: CPT

## 2023-05-25 ENCOUNTER — TRANSCRIPTION ENCOUNTER (OUTPATIENT)
Age: 53
End: 2023-05-25

## 2023-05-25 DIAGNOSIS — S29.9XXA UNSPECIFIED INJURY OF THORAX, INITIAL ENCOUNTER: ICD-10-CM

## 2023-05-28 ENCOUNTER — INPATIENT (INPATIENT)
Facility: HOSPITAL | Age: 53
LOS: 1 days | Discharge: HOME | DRG: 419 | End: 2023-05-30
Attending: SURGERY | Admitting: SURGERY
Payer: COMMERCIAL

## 2023-05-28 ENCOUNTER — TRANSCRIPTION ENCOUNTER (OUTPATIENT)
Age: 53
End: 2023-05-28

## 2023-05-28 VITALS
HEIGHT: 64 IN | OXYGEN SATURATION: 99 % | HEART RATE: 55 BPM | SYSTOLIC BLOOD PRESSURE: 196 MMHG | WEIGHT: 184.97 LBS | RESPIRATION RATE: 18 BRPM | DIASTOLIC BLOOD PRESSURE: 99 MMHG | TEMPERATURE: 98 F

## 2023-05-28 DIAGNOSIS — K81.9 CHOLECYSTITIS, UNSPECIFIED: ICD-10-CM

## 2023-05-28 DIAGNOSIS — Z98.890 OTHER SPECIFIED POSTPROCEDURAL STATES: Chronic | ICD-10-CM

## 2023-05-28 DIAGNOSIS — Z90.49 ACQUIRED ABSENCE OF OTHER SPECIFIED PARTS OF DIGESTIVE TRACT: Chronic | ICD-10-CM

## 2023-05-28 LAB
ALBUMIN SERPL ELPH-MCNC: 4.5 G/DL — SIGNIFICANT CHANGE UP (ref 3.3–5)
ALP SERPL-CCNC: 71 U/L — SIGNIFICANT CHANGE UP (ref 40–120)
ALT FLD-CCNC: 24 U/L — SIGNIFICANT CHANGE UP (ref 10–45)
ANION GAP SERPL CALC-SCNC: 16 MMOL/L — SIGNIFICANT CHANGE UP (ref 5–17)
APPEARANCE UR: CLEAR — SIGNIFICANT CHANGE UP
APTT BLD: 32 SEC — SIGNIFICANT CHANGE UP (ref 27.5–35.5)
AST SERPL-CCNC: 21 U/L — SIGNIFICANT CHANGE UP (ref 10–40)
BASE EXCESS BLDV CALC-SCNC: 0.4 MMOL/L — SIGNIFICANT CHANGE UP (ref -2–3)
BASOPHILS # BLD AUTO: 0.06 K/UL — SIGNIFICANT CHANGE UP (ref 0–0.2)
BASOPHILS NFR BLD AUTO: 0.9 % — SIGNIFICANT CHANGE UP (ref 0–2)
BILIRUB SERPL-MCNC: 0.6 MG/DL — SIGNIFICANT CHANGE UP (ref 0.2–1.2)
BILIRUB UR-MCNC: NEGATIVE — SIGNIFICANT CHANGE UP
BLD GP AB SCN SERPL QL: NEGATIVE — SIGNIFICANT CHANGE UP
BUN SERPL-MCNC: 16 MG/DL — SIGNIFICANT CHANGE UP (ref 7–23)
CA-I SERPL-SCNC: 1.25 MMOL/L — SIGNIFICANT CHANGE UP (ref 1.15–1.33)
CALCIUM SERPL-MCNC: 9.4 MG/DL — SIGNIFICANT CHANGE UP (ref 8.4–10.5)
CHLORIDE BLDV-SCNC: 104 MMOL/L — SIGNIFICANT CHANGE UP (ref 96–108)
CHLORIDE SERPL-SCNC: 106 MMOL/L — SIGNIFICANT CHANGE UP (ref 96–108)
CO2 BLDV-SCNC: 28 MMOL/L — HIGH (ref 22–26)
CO2 SERPL-SCNC: 18 MMOL/L — LOW (ref 22–31)
COLOR SPEC: SIGNIFICANT CHANGE UP
CREAT SERPL-MCNC: 0.92 MG/DL — SIGNIFICANT CHANGE UP (ref 0.5–1.3)
DIFF PNL FLD: NEGATIVE — SIGNIFICANT CHANGE UP
EGFR: 98 ML/MIN/1.73M2 — SIGNIFICANT CHANGE UP
EOSINOPHIL # BLD AUTO: 0.13 K/UL — SIGNIFICANT CHANGE UP (ref 0–0.5)
EOSINOPHIL NFR BLD AUTO: 1.9 % — SIGNIFICANT CHANGE UP (ref 0–6)
GAS PNL BLDV: 137 MMOL/L — SIGNIFICANT CHANGE UP (ref 136–145)
GAS PNL BLDV: SIGNIFICANT CHANGE UP
GLUCOSE BLDV-MCNC: 104 MG/DL — HIGH (ref 70–99)
GLUCOSE SERPL-MCNC: 103 MG/DL — HIGH (ref 70–99)
GLUCOSE UR QL: NEGATIVE — SIGNIFICANT CHANGE UP
HCO3 BLDV-SCNC: 27 MMOL/L — SIGNIFICANT CHANGE UP (ref 22–29)
HCT VFR BLD CALC: 42.6 % — SIGNIFICANT CHANGE UP (ref 39–50)
HCT VFR BLDA CALC: 43 % — SIGNIFICANT CHANGE UP (ref 39–51)
HGB BLD CALC-MCNC: 14.4 G/DL — SIGNIFICANT CHANGE UP (ref 12.6–17.4)
HGB BLD-MCNC: 14.3 G/DL — SIGNIFICANT CHANGE UP (ref 13–17)
IMM GRANULOCYTES NFR BLD AUTO: 0.3 % — SIGNIFICANT CHANGE UP (ref 0–0.9)
INR BLD: 1.08 RATIO — SIGNIFICANT CHANGE UP (ref 0.88–1.16)
KETONES UR-MCNC: NEGATIVE — SIGNIFICANT CHANGE UP
LACTATE BLDV-MCNC: 1.2 MMOL/L — SIGNIFICANT CHANGE UP (ref 0.5–2)
LEUKOCYTE ESTERASE UR-ACNC: NEGATIVE — SIGNIFICANT CHANGE UP
LIDOCAIN IGE QN: 22 U/L — SIGNIFICANT CHANGE UP (ref 7–60)
LYMPHOCYTES # BLD AUTO: 2.41 K/UL — SIGNIFICANT CHANGE UP (ref 1–3.3)
LYMPHOCYTES # BLD AUTO: 35.9 % — SIGNIFICANT CHANGE UP (ref 13–44)
MCHC RBC-ENTMCNC: 30.3 PG — SIGNIFICANT CHANGE UP (ref 27–34)
MCHC RBC-ENTMCNC: 33.6 GM/DL — SIGNIFICANT CHANGE UP (ref 32–36)
MCV RBC AUTO: 90.3 FL — SIGNIFICANT CHANGE UP (ref 80–100)
MONOCYTES # BLD AUTO: 0.56 K/UL — SIGNIFICANT CHANGE UP (ref 0–0.9)
MONOCYTES NFR BLD AUTO: 8.3 % — SIGNIFICANT CHANGE UP (ref 2–14)
NEUTROPHILS # BLD AUTO: 3.53 K/UL — SIGNIFICANT CHANGE UP (ref 1.8–7.4)
NEUTROPHILS NFR BLD AUTO: 52.7 % — SIGNIFICANT CHANGE UP (ref 43–77)
NITRITE UR-MCNC: NEGATIVE — SIGNIFICANT CHANGE UP
NRBC # BLD: 0 /100 WBCS — SIGNIFICANT CHANGE UP (ref 0–0)
PCO2 BLDV: 50 MMHG — SIGNIFICANT CHANGE UP (ref 42–55)
PH BLDV: 7.34 — SIGNIFICANT CHANGE UP (ref 7.32–7.43)
PH UR: 5.5 — SIGNIFICANT CHANGE UP (ref 5–8)
PLATELET # BLD AUTO: 197 K/UL — SIGNIFICANT CHANGE UP (ref 150–400)
PO2 BLDV: 34 MMHG — SIGNIFICANT CHANGE UP (ref 25–45)
POTASSIUM BLDV-SCNC: 4.6 MMOL/L — SIGNIFICANT CHANGE UP (ref 3.5–5.1)
POTASSIUM SERPL-MCNC: 4.4 MMOL/L — SIGNIFICANT CHANGE UP (ref 3.5–5.3)
POTASSIUM SERPL-SCNC: 4.4 MMOL/L — SIGNIFICANT CHANGE UP (ref 3.5–5.3)
PROT SERPL-MCNC: 7.4 G/DL — SIGNIFICANT CHANGE UP (ref 6–8.3)
PROT UR-MCNC: NEGATIVE — SIGNIFICANT CHANGE UP
PROTHROM AB SERPL-ACNC: 12.5 SEC — SIGNIFICANT CHANGE UP (ref 10.5–13.4)
RBC # BLD: 4.72 M/UL — SIGNIFICANT CHANGE UP (ref 4.2–5.8)
RBC # FLD: 11.9 % — SIGNIFICANT CHANGE UP (ref 10.3–14.5)
RH IG SCN BLD-IMP: POSITIVE — SIGNIFICANT CHANGE UP
SAO2 % BLDV: 54.2 % — LOW (ref 67–88)
SODIUM SERPL-SCNC: 140 MMOL/L — SIGNIFICANT CHANGE UP (ref 135–145)
SP GR SPEC: 1.02 — SIGNIFICANT CHANGE UP (ref 1.01–1.02)
TROPONIN T, HIGH SENSITIVITY RESULT: 15 NG/L — SIGNIFICANT CHANGE UP (ref 0–51)
UROBILINOGEN FLD QL: NEGATIVE — SIGNIFICANT CHANGE UP
WBC # BLD: 6.71 K/UL — SIGNIFICANT CHANGE UP (ref 3.8–10.5)
WBC # FLD AUTO: 6.71 K/UL — SIGNIFICANT CHANGE UP (ref 3.8–10.5)

## 2023-05-28 PROCEDURE — 71045 X-RAY EXAM CHEST 1 VIEW: CPT | Mod: 26

## 2023-05-28 PROCEDURE — 99285 EMERGENCY DEPT VISIT HI MDM: CPT

## 2023-05-28 PROCEDURE — 76705 ECHO EXAM OF ABDOMEN: CPT | Mod: 26

## 2023-05-28 RX ORDER — CEFOTETAN DISODIUM 1 G
2 VIAL (EA) INJECTION ONCE
Refills: 0 | Status: COMPLETED | OUTPATIENT
Start: 2023-05-28 | End: 2023-05-28

## 2023-05-28 RX ORDER — CEFOTETAN DISODIUM 1 G
VIAL (EA) INJECTION
Refills: 0 | Status: DISCONTINUED | OUTPATIENT
Start: 2023-05-28 | End: 2023-05-29

## 2023-05-28 RX ORDER — CEFOTETAN DISODIUM 1 G
2 VIAL (EA) INJECTION EVERY 12 HOURS
Refills: 0 | Status: DISCONTINUED | OUTPATIENT
Start: 2023-05-28 | End: 2023-05-29

## 2023-05-28 RX ORDER — ENOXAPARIN SODIUM 100 MG/ML
40 INJECTION SUBCUTANEOUS EVERY 24 HOURS
Refills: 0 | Status: DISCONTINUED | OUTPATIENT
Start: 2023-05-28 | End: 2023-05-29

## 2023-05-28 RX ORDER — HYDROMORPHONE HYDROCHLORIDE 2 MG/ML
0.5 INJECTION INTRAMUSCULAR; INTRAVENOUS; SUBCUTANEOUS EVERY 4 HOURS
Refills: 0 | Status: DISCONTINUED | OUTPATIENT
Start: 2023-05-28 | End: 2023-05-29

## 2023-05-28 RX ORDER — ACETAMINOPHEN 500 MG
1000 TABLET ORAL EVERY 6 HOURS
Refills: 0 | Status: DISCONTINUED | OUTPATIENT
Start: 2023-05-28 | End: 2023-05-29

## 2023-05-28 RX ORDER — LEVETIRACETAM 250 MG/1
500 TABLET, FILM COATED ORAL
Refills: 0 | Status: DISCONTINUED | OUTPATIENT
Start: 2023-05-28 | End: 2023-05-29

## 2023-05-28 RX ORDER — SODIUM CHLORIDE 9 MG/ML
1000 INJECTION, SOLUTION INTRAVENOUS
Refills: 0 | Status: DISCONTINUED | OUTPATIENT
Start: 2023-05-28 | End: 2023-05-29

## 2023-05-28 RX ADMIN — Medication 400 MILLIGRAM(S): at 08:35

## 2023-05-28 RX ADMIN — SODIUM CHLORIDE 100 MILLILITER(S): 9 INJECTION, SOLUTION INTRAVENOUS at 18:05

## 2023-05-28 RX ADMIN — SODIUM CHLORIDE 100 MILLILITER(S): 9 INJECTION, SOLUTION INTRAVENOUS at 08:35

## 2023-05-28 RX ADMIN — Medication 1000 MILLIGRAM(S): at 09:05

## 2023-05-28 RX ADMIN — Medication 100 GRAM(S): at 08:55

## 2023-05-28 RX ADMIN — Medication 100 GRAM(S): at 22:25

## 2023-05-28 RX ADMIN — ENOXAPARIN SODIUM 40 MILLIGRAM(S): 100 INJECTION SUBCUTANEOUS at 08:39

## 2023-05-28 RX ADMIN — LEVETIRACETAM 500 MILLIGRAM(S): 250 TABLET, FILM COATED ORAL at 08:36

## 2023-05-28 RX ADMIN — LEVETIRACETAM 500 MILLIGRAM(S): 250 TABLET, FILM COATED ORAL at 18:03

## 2023-05-28 NOTE — PRE-ANESTHESIA EVALUATION ADULT - NSANTHPMHFT_GEN_ALL_CORE
54 y/o male;  meningioma s/p resection and seizure disorder on Keppra, asthma on inhalers, open appendectomy, colonoscopy few mos ago  and Right inguinal hernia repair, now with abdominal pain,  RUQ US revealed a large mobile gallstone within the gallbladder and positive Samuels's sign

## 2023-05-28 NOTE — ED PROVIDER NOTE - ATTENDING CONTRIBUTION TO CARE
52 yo M AOx4 from home with PMH of cholelithiases, seizures, meningioma, asthma & PSH of appendectomy and hernia repair c/o RUQ abdominal pain that radiates to the right back. Pt states onset of abdominal symptoms started 2 weeks ago and has been intermittent. Pt states abdominal pain worseness when sitting and lying down.  right upper quadrant tenderness to palpation, no rebound/guarding  mild discomfort secondary to pain  non-tachycardic  non-tachypneic  mmm  Cameron Nowak MD, FACEP: In this physician's medical judgement based on clinical history and physical exam the patient's signs and symptoms lead to differential diagnoses which includes but is not limited to: biliary colic, cholecystitis, choledocholithiasis     Historical features, symptoms, and clinical exam not consistent with: sepsis    Labs were ordered and independently reviewed by me.  EKG was ordered and independently reviewed by me.  Imaging was ordered and reviewed by me.      Appropriate medications for the patient's presenting complaints were ordered, and effects were reassessed.     Patient's records including prior hospital visit, med and medical history were reviewed.   Escalation to admission/observation was considered.    Will follow up on labs, therapeutics, imaging, reassess and disposition as clinically indicated.  *The above represents an initial assessment/impression. Please refer to my progress notes below for potential changes in patient clinical course*

## 2023-05-28 NOTE — ED ADULT NURSE REASSESSMENT NOTE - NS ED NURSE REASSESS COMMENT FT1
Pt taken to US. Comfort care and safety measures provided. Pt AOx4 with stable VS. Pending Urine sample.

## 2023-05-28 NOTE — ED ADULT NURSE NOTE - NSFALLUNIVINTERV_ED_ALL_ED
Bed/Stretcher in lowest position, wheels locked, appropriate side rails in place/Call bell, personal items and telephone in reach/Instruct patient to call for assistance before getting out of bed/chair/stretcher/Non-slip footwear applied when patient is off stretcher/[place] to call system/Physically safe environment - no spills, clutter or unnecessary equipment/Purposeful proactive rounding/Room/bathroom lighting operational, light cord in reach

## 2023-05-28 NOTE — ED PROVIDER NOTE - PROGRESS NOTE DETAILS
Sukh PGY2: Patient endorsed to me at sign out. Patient pending surgical consult - discussed case with surgery who will take patient for possible acute lana.

## 2023-05-28 NOTE — ED ADULT NURSE REASSESSMENT NOTE - NS ED NURSE REASSESS COMMENT FT1
Report received from RN Humberto. Pt A&Ox3, breathing spontaneously and unlabored, speaking full sentences, moving all extremities, is ambulatory. Pt endorsing abd pain at this time, MD made aware, awaiting further RN interventions. Report received from RN Humberto. Pt A&Ox3, breathing spontaneously and unlabored, speaking full sentences, moving all extremities, is ambulatory. Pt endorsing abd pain at this time, surgery MD made aware, awaiting further RN interventions.

## 2023-05-28 NOTE — PRE-ANESTHESIA EVALUATION ADULT - NSRADCARDRESULTSFT_GEN_ALL_CORE
TTE 7/2021: EF 75%  Conclusions:  1. Normal mitral valve. Minimal mitral regurgitation.  2. Normal trileaflet aortic valve. Minimal aortic  regurgitation.  3. Hyperdynamic left ventricular systolic function.  4. Normal right ventricular size and function.  *** No previous Echo exam.  ------------------------------------------------------------------------  Confirmed on  7/2/2021 - 08:08:53 by Leandra Lopez M.D.  ------------------------------------------------------------------------

## 2023-05-28 NOTE — ED CLERICAL - NS ED CLERK NOTE PRE-ARRIVAL INFORMATION; ADDITIONAL PRE-ARRIVAL INFORMATION
CC/Reason For referral: Severe RUQ pain, R/O Cholecystitis  Preferred Consultant(if applicable):  Who admits for you (if needed):  Do you have documents you would like to fax over? N/A  Would you still like to speak to an ED attending? Yes

## 2023-05-28 NOTE — H&P ADULT - NSHPLABSRESULTS_GEN_ALL_CORE
LABS:                        14.3   6.71  )-----------( 197      ( 28 May 2023 04:56 )             42.6     28 May 2023 04:56    140    |  106    |  16     ----------------------------<  103    4.4     |  18     |  0.92     Ca    9.4        28 May 2023 04:56    TPro  7.4    /  Alb  4.5    /  TBili  0.6    /  DBili  x      /  AST  21     /  ALT  24     /  AlkPhos  71     28 May 2023 04:56    PT/INR - ( 28 May 2023 04:56 )   PT: 12.5 sec;   INR: 1.08 ratio         PTT - ( 28 May 2023 04:56 )  PTT:32.0 sec  CAPILLARY BLOOD GLUCOSE            LIVER FUNCTIONS - ( 28 May 2023 04:56 )  Alb: 4.5 g/dL / Pro: 7.4 g/dL / ALK PHOS: 71 U/L / ALT: 24 U/L / AST: 21 U/L / GGT: x             Urinalysis Basic - ( 28 May 2023 06:20 )    Color: Light Yellow / Appearance: Clear / S.019 / pH: x  Gluc: x / Ketone: Negative  / Bili: Negative / Urobili: Negative   Blood: x / Protein: Negative / Nitrite: Negative   Leuk Esterase: Negative / RBC: x / WBC x   Sq Epi: x / Non Sq Epi: x / Bacteria: x      IMAGING:    US Abdomen Upper Quadrant Right (23 @ 05:40)    FINDINGS:  Liver: 16 cm. Smooth, normal echotexture. Bilateral hyperechoic hepatic   lesions corresponding with previously described hemangiomas, measuring   0.8 x 0.8 x 0.7 cm on the right and 1.1 x 0.8 x 2.9 cm onthe left. No   surface nodularity.  Bile ducts: Normal caliber. Common bile duct measures 3 mm.  Gallbladder: Mildly distended with a 1.7 cm large mobile gallstone.  No   gallbladder wall thickening. Ultrasonographer reports positive   sonographic Samuels's sign.  Pancreas: Visualized portions are within normal limits.  Right kidney: 9 cm. No hydronephrosis.  Ascites: None.  IVC: Visualized portions are within normal limits.    IMPRESSION:  Gallstones along with a positive sonographic Samuels sign are compatible   with acute cholecystitis in the appropriate clinical setting. No biliary   dilatation.

## 2023-05-28 NOTE — H&P ADULT - ASSESSMENT
53M w/ PMHx of seizure disorder on Keppra presenting with clinical acute cholecystitis.    PLAN:  - Admit to ACS under Dr. Benson.  - Added-on to OR for laparoscopic cholecystectomy  - NPO/IVFs  - IV abx  - VTE ppx  - Pain control as needed    Discussed with attending surgeon Dr. Benson.    MINA Luna, PGY-3  [facility]   Acute Care Surgery   [id number]

## 2023-05-28 NOTE — H&P ADULT - HISTORY OF PRESENT ILLNESS
53M w/ PMHx of meningioma s/p resection and seizure disorder on Keppra, open appendectomy and Right inguinal hernia repair who presents to the ED with a 2 week history of right sided abdominal pain. Patient states he went to a wedding 2 weeks ago and since then he has had intermittent abdominal pain that has become constant in the last few days. No fevers/chills, nausea/vomiting, chest pain/shortness of breath, or dizziness/lightheadedness.    In the ED, patient was afebrile, hemodynamically stable, labs largely unremarkable, RUQ US revealed a large mobile gallstone within the gallbladder and positive Samuels's sign. Surgery consulted for evaluation    Patient's last colonoscopy was a few months ago which was unremarkable per patient report.

## 2023-05-28 NOTE — ED PROVIDER NOTE - OBJECTIVE STATEMENT
54 yo M with 2 weeks of RUQ pain. Known hx of gallstones. He states he changed his diet and his sx mostly resolved. No fever, chills, nausea, vomiting or diarrhea. He previously had an appendectomy and multiple hernia repairs. He denies urinary sx and is tolerating po.

## 2023-05-28 NOTE — ED PROVIDER NOTE - CLINICAL SUMMARY MEDICAL DECISION MAKING FREE TEXT BOX
52 yo M with 2 weeks of RUQ pain. Known hx of gallstones. Differential diagnosis includes but is not limited to gerd, biliary colic, cholecystitis, infection, colitis. would get labs, US, and tx his pain. reassess. dispo based off of imaging.

## 2023-05-28 NOTE — ED PROVIDER NOTE - PHYSICAL EXAMINATION
General: Well-appearing, NAD  Head: Atraumatic, normocephalic  Neurology: A&Ox 3 , nonfocal, COHEN x 4  Respiratory: normal respiratory effort  CV: Extremities warm and well perfused  Abdominal: Soft, non-distended, non-tender  Extremities: No edema  Skin: No rashes

## 2023-05-28 NOTE — ED ADULT NURSE NOTE - OBJECTIVE STATEMENT
54 yo M AOx4 from home with PMH of cholelithiases, seizures, meningioma, asthma & PSH of appendectomy and hernia repair c/o RUQ abdominal pain that radiates to the right back. Pt states onset of abdominal symptoms started 2 weeks ago and has been intermittent. Pt states abdominal pain worseness when sitting and lying down. Pt endorses pain on RUQ palpation. Pt initially presents to [facility] ED in no acute distress with unlabored, clear, equal breath sounds bilaterally from apices to bases. Pt abdomen soft and nondistended. Call bell in reach. Stretcher in lowest position locked with blanket given. Comfort care and safety measures provided. Pt denies c/p, sob, N/V/D, fevers, chills, dysuria, blood in urine and stool.

## 2023-05-28 NOTE — PATIENT PROFILE ADULT - FALL HARM RISK - UNIVERSAL INTERVENTIONS
Bed in lowest position, wheels locked, appropriate side rails in place/Call bell, personal items and telephone in reach/Instruct patient to call for assistance before getting out of bed or chair/Non-slip footwear when patient is out of bed/[place] to call system/Physically safe environment - no spills, clutter or unnecessary equipment/Purposeful Proactive Rounding/Room/bathroom lighting operational, light cord in reach

## 2023-05-28 NOTE — H&P ADULT - NSHPPHYSICALEXAM_GEN_ALL_CORE
VITAL SIGNS:  Vital Signs Last 24 Hrs  T(C): 36.6 (28 May 2023 07:26), Max: 36.7 (28 May 2023 04:30)  T(F): 97.8 (28 May 2023 07:26), Max: 98 (28 May 2023 04:30)  HR: 58 (28 May 2023 07:26) (54 - 58)  BP: 150/80 (28 May 2023 07:26) (150/80 - 196/99)  BP(mean): --  RR: 16 (28 May 2023 07:26) (16 - 18)  SpO2: 99% (28 May 2023 07:26) (98% - 100%)    Parameters below as of 28 May 2023 07:26  Patient On (Oxygen Delivery Method): room air    PHYSICAL EXAM:    General: NAD, Sitting in bed comfortably  HEENT: NC/AT, EOMI  Neck: Soft, supple  Cardio: RRR, nml S1/S2  Resp: Good effort, CTA b/l  GI/Abd: Soft, RUQ tenderness, RLQ vertical surgical scar and R inguinal surgical scar, no rebound/guarding, no masses palpated  Musculoskeletal: All 4 extremities moving spontaneously, no limitations

## 2023-05-29 ENCOUNTER — RESULT REVIEW (OUTPATIENT)
Age: 53
End: 2023-05-29

## 2023-05-29 LAB
ALBUMIN SERPL ELPH-MCNC: 4 G/DL — SIGNIFICANT CHANGE UP (ref 3.3–5)
ALP SERPL-CCNC: 61 U/L — SIGNIFICANT CHANGE UP (ref 40–120)
ALT FLD-CCNC: 19 U/L — SIGNIFICANT CHANGE UP (ref 10–45)
ANION GAP SERPL CALC-SCNC: 11 MMOL/L — SIGNIFICANT CHANGE UP (ref 5–17)
AST SERPL-CCNC: 13 U/L — SIGNIFICANT CHANGE UP (ref 10–40)
BILIRUB SERPL-MCNC: 0.9 MG/DL — SIGNIFICANT CHANGE UP (ref 0.2–1.2)
BLD GP AB SCN SERPL QL: NEGATIVE — SIGNIFICANT CHANGE UP
BUN SERPL-MCNC: 12 MG/DL — SIGNIFICANT CHANGE UP (ref 7–23)
CALCIUM SERPL-MCNC: 9 MG/DL — SIGNIFICANT CHANGE UP (ref 8.4–10.5)
CHLORIDE SERPL-SCNC: 107 MMOL/L — SIGNIFICANT CHANGE UP (ref 96–108)
CO2 SERPL-SCNC: 23 MMOL/L — SIGNIFICANT CHANGE UP (ref 22–31)
CREAT SERPL-MCNC: 0.95 MG/DL — SIGNIFICANT CHANGE UP (ref 0.5–1.3)
EGFR: 95 ML/MIN/1.73M2 — SIGNIFICANT CHANGE UP
GLUCOSE SERPL-MCNC: 90 MG/DL — SIGNIFICANT CHANGE UP (ref 70–99)
HCT VFR BLD CALC: 36.7 % — LOW (ref 39–50)
HGB BLD-MCNC: 12.6 G/DL — LOW (ref 13–17)
MAGNESIUM SERPL-MCNC: 1.8 MG/DL — SIGNIFICANT CHANGE UP (ref 1.6–2.6)
MCHC RBC-ENTMCNC: 30.1 PG — SIGNIFICANT CHANGE UP (ref 27–34)
MCHC RBC-ENTMCNC: 34.3 GM/DL — SIGNIFICANT CHANGE UP (ref 32–36)
MCV RBC AUTO: 87.6 FL — SIGNIFICANT CHANGE UP (ref 80–100)
NRBC # BLD: 0 /100 WBCS — SIGNIFICANT CHANGE UP (ref 0–0)
PHOSPHATE SERPL-MCNC: 3.7 MG/DL — SIGNIFICANT CHANGE UP (ref 2.5–4.5)
PLATELET # BLD AUTO: 186 K/UL — SIGNIFICANT CHANGE UP (ref 150–400)
POTASSIUM SERPL-MCNC: 3.7 MMOL/L — SIGNIFICANT CHANGE UP (ref 3.5–5.3)
POTASSIUM SERPL-SCNC: 3.7 MMOL/L — SIGNIFICANT CHANGE UP (ref 3.5–5.3)
PROT SERPL-MCNC: 6.2 G/DL — SIGNIFICANT CHANGE UP (ref 6–8.3)
RBC # BLD: 4.19 M/UL — LOW (ref 4.2–5.8)
RBC # FLD: 12 % — SIGNIFICANT CHANGE UP (ref 10.3–14.5)
RH IG SCN BLD-IMP: POSITIVE — SIGNIFICANT CHANGE UP
SODIUM SERPL-SCNC: 141 MMOL/L — SIGNIFICANT CHANGE UP (ref 135–145)
WBC # BLD: 5.58 K/UL — SIGNIFICANT CHANGE UP (ref 3.8–10.5)
WBC # FLD AUTO: 5.58 K/UL — SIGNIFICANT CHANGE UP (ref 3.8–10.5)

## 2023-05-29 PROCEDURE — 88304 TISSUE EXAM BY PATHOLOGIST: CPT | Mod: 26

## 2023-05-29 PROCEDURE — 47562 LAPAROSCOPIC CHOLECYSTECTOMY: CPT

## 2023-05-29 DEVICE — ARISTA 3GR: Type: IMPLANTABLE DEVICE | Status: FUNCTIONAL

## 2023-05-29 DEVICE — CLIP APPLIER COVIDIEN ENDOCLIP II 10MM MED/LG: Type: IMPLANTABLE DEVICE | Status: FUNCTIONAL

## 2023-05-29 RX ORDER — ONDANSETRON 8 MG/1
4 TABLET, FILM COATED ORAL ONCE
Refills: 0 | Status: DISCONTINUED | OUTPATIENT
Start: 2023-05-29 | End: 2023-05-29

## 2023-05-29 RX ORDER — HYDROMORPHONE HYDROCHLORIDE 2 MG/ML
0.5 INJECTION INTRAMUSCULAR; INTRAVENOUS; SUBCUTANEOUS
Refills: 0 | Status: DISCONTINUED | OUTPATIENT
Start: 2023-05-29 | End: 2023-05-29

## 2023-05-29 RX ORDER — ENOXAPARIN SODIUM 100 MG/ML
40 INJECTION SUBCUTANEOUS EVERY 24 HOURS
Refills: 0 | Status: DISCONTINUED | OUTPATIENT
Start: 2023-05-29 | End: 2023-05-30

## 2023-05-29 RX ORDER — SODIUM CHLORIDE 9 MG/ML
1000 INJECTION, SOLUTION INTRAVENOUS
Refills: 0 | Status: DISCONTINUED | OUTPATIENT
Start: 2023-05-29 | End: 2023-05-29

## 2023-05-29 RX ORDER — LEVETIRACETAM 250 MG/1
500 TABLET, FILM COATED ORAL
Refills: 0 | Status: DISCONTINUED | OUTPATIENT
Start: 2023-05-29 | End: 2023-05-30

## 2023-05-29 RX ORDER — ACETAMINOPHEN 500 MG
975 TABLET ORAL EVERY 6 HOURS
Refills: 0 | Status: DISCONTINUED | OUTPATIENT
Start: 2023-05-29 | End: 2023-05-30

## 2023-05-29 RX ORDER — OXYCODONE HYDROCHLORIDE 5 MG/1
5 TABLET ORAL EVERY 4 HOURS
Refills: 0 | Status: DISCONTINUED | OUTPATIENT
Start: 2023-05-29 | End: 2023-05-30

## 2023-05-29 RX ADMIN — ENOXAPARIN SODIUM 40 MILLIGRAM(S): 100 INJECTION SUBCUTANEOUS at 08:55

## 2023-05-29 RX ADMIN — Medication 975 MILLIGRAM(S): at 22:46

## 2023-05-29 RX ADMIN — Medication 100 GRAM(S): at 08:55

## 2023-05-29 RX ADMIN — LEVETIRACETAM 500 MILLIGRAM(S): 250 TABLET, FILM COATED ORAL at 05:38

## 2023-05-29 RX ADMIN — LEVETIRACETAM 500 MILLIGRAM(S): 250 TABLET, FILM COATED ORAL at 17:21

## 2023-05-29 RX ADMIN — Medication 975 MILLIGRAM(S): at 23:16

## 2023-05-29 NOTE — PROGRESS NOTE ADULT - SUBJECTIVE AND OBJECTIVE BOX
SURGERY DAILY PROGRESS NOTE:     SUBJECTIVE/ROS: Patient seen at bedside this AM.    24h Events:   - Overnight, no acute events. OR delayed d/t emergency, pt notified OR today tent     OBJECTIVE:  Vital Signs Last 24 Hrs  T(C): 36.7 (28 May 2023 22:51), Max: 37.2 (28 May 2023 22:28)  T(F): 98.1 (28 May 2023 22:51), Max: 99 (28 May 2023 22:28)  HR: 50 (28 May 2023 22:51) (50 - 80)  BP: 164/83 (28 May 2023 22:51) (135/79 - 164/83)  BP(mean): 99 (28 May 2023 19:17) (99 - 99)  RR: 18 (28 May 2023 22:51) (16 - 18)  SpO2: 97% (28 May 2023 22:51) (97% - 100%)    Parameters below as of 28 May 2023 22:51  Patient On (Oxygen Delivery Method): room air      I&O's Detail    Daily Height in cm: 162.56 (28 May 2023 19:17)    Daily   MEDICATIONS  (STANDING):  cefoTEtan  IVPB 2 Gram(s) IV Intermittent every 12 hours  cefoTEtan  IVPB      enoxaparin Injectable 40 milliGRAM(s) SubCutaneous every 24 hours  lactated ringers. 1000 milliLiter(s) (100 mL/Hr) IV Continuous <Continuous>  levETIRAcetam 500 milliGRAM(s) Oral two times a day    MEDICATIONS  (PRN):  acetaminophen   IVPB .. 1000 milliGRAM(s) IV Intermittent every 6 hours PRN Temp greater or equal to 38C (100.4F), Mild Pain (1 - 3)  HYDROmorphone  Injectable 0.5 milliGRAM(s) IV Push every 4 hours PRN Moderate Pain (4 - 6)      LABS:                        14.3   6.71  )-----------( 197      ( 28 May 2023 04:56 )             42.6     -28    140  |  106  |  16  ----------------------------<  103<H>  4.4   |  18<L>  |  0.92    Ca    9.4      28 May 2023 04:56    TPro  7.4  /  Alb  4.5  /  TBili  0.6  /  DBili  x   /  AST  21  /  ALT  24  /  AlkPhos  71  05-28    PT/INR - ( 28 May 2023 04:56 )   PT: 12.5 sec;   INR: 1.08 ratio         PTT - ( 28 May 2023 04:56 )  PTT:32.0 sec  Urinalysis Basic - ( 28 May 2023 06:20 )    Color: Light Yellow / Appearance: Clear / S.019 / pH: x  Gluc: x / Ketone: Negative  / Bili: Negative / Urobili: Negative   Blood: x / Protein: Negative / Nitrite: Negative   Leuk Esterase: Negative / RBC: x / WBC x   Sq Epi: x / Non Sq Epi: x / Bacteria: x                PHYSICAL EXAM:  Gen: AAOx3, non-toxic  Head: NCAT  HEENT: EOMI, oral mucosa moist, normal conjunctiva  Lung: Breathing on RA, unlabored   Abd: soft, NTND, no guarding.   MSK: no visible deformities  Neuro: No focal sensory or motor deficits   SURGERY DAILY PROGRESS NOTE:     SUBJECTIVE/ROS: Patient seen at bedside this AM. Overall Pain controlled.     24h Events:   - Overnight, no acute events. OR delayed d/t emergency, pt notified OR today tent     OBJECTIVE:  Vital Signs Last 24 Hrs  T(C): 36.7 (28 May 2023 22:51), Max: 37.2 (28 May 2023 22:28)  T(F): 98.1 (28 May 2023 22:51), Max: 99 (28 May 2023 22:28)  HR: 50 (28 May 2023 22:51) (50 - 80)  BP: 164/83 (28 May 2023 22:51) (135/79 - 164/83)  BP(mean): 99 (28 May 2023 19:17) (99 - 99)  RR: 18 (28 May 2023 22:51) (16 - 18)  SpO2: 97% (28 May 2023 22:51) (97% - 100%)    Parameters below as of 28 May 2023 22:51  Patient On (Oxygen Delivery Method): room air      I&O's Detail    Daily Height in cm: 162.56 (28 May 2023 19:17)    Daily   MEDICATIONS  (STANDING):  cefoTEtan  IVPB 2 Gram(s) IV Intermittent every 12 hours  cefoTEtan  IVPB      enoxaparin Injectable 40 milliGRAM(s) SubCutaneous every 24 hours  lactated ringers. 1000 milliLiter(s) (100 mL/Hr) IV Continuous <Continuous>  levETIRAcetam 500 milliGRAM(s) Oral two times a day    MEDICATIONS  (PRN):  acetaminophen   IVPB .. 1000 milliGRAM(s) IV Intermittent every 6 hours PRN Temp greater or equal to 38C (100.4F), Mild Pain (1 - 3)  HYDROmorphone  Injectable 0.5 milliGRAM(s) IV Push every 4 hours PRN Moderate Pain (4 - 6)      LABS:                        14.3   6.71  )-----------( 197      ( 28 May 2023 04:56 )             42.6     -    140  |  106  |  16  ----------------------------<  103<H>  4.4   |  18<L>  |  0.92    Ca    9.4      28 May 2023 04:56    TPro  7.4  /  Alb  4.5  /  TBili  0.6  /  DBili  x   /  AST  21  /  ALT  24  /  AlkPhos  71  05-28    PT/INR - ( 28 May 2023 04:56 )   PT: 12.5 sec;   INR: 1.08 ratio         PTT - ( 28 May 2023 04:56 )  PTT:32.0 sec  Urinalysis Basic - ( 28 May 2023 06:20 )    Color: Light Yellow / Appearance: Clear / S.019 / pH: x  Gluc: x / Ketone: Negative  / Bili: Negative / Urobili: Negative   Blood: x / Protein: Negative / Nitrite: Negative   Leuk Esterase: Negative / RBC: x / WBC x   Sq Epi: x / Non Sq Epi: x / Bacteria: x                PHYSICAL EXAM:  Gen: AAOx3, non-toxic  Head: NCAT  HEENT: EOMI, oral mucosa moist, normal conjunctiva  Lung: Breathing on RA, unlabored   Abd: soft, RUQ tenderness, ND, no guarding.   MSK: no visible deformities  Neuro: No focal sensory or motor deficits

## 2023-05-29 NOTE — BRIEF OPERATIVE NOTE - OPERATION/FINDINGS
Uncomplicated laparoscopic cholecystectomy. 10mm umbilical, 11mm subxyphoid, and 5mm x2 lateral ports.

## 2023-05-29 NOTE — PROGRESS NOTE ADULT - ASSESSMENT
53M w/ PMHx of seizure disorder on Keppra presenting with clinical acute cholecystitis.    PLAN:  - OR today  - Added-on to OR for laparoscopic cholecystectomy  - NPO/IVFs  - IV abx  - VTE ppx  - Pain control as needed    [facility]   Acute Care Surgery   [phone]

## 2023-05-29 NOTE — CHART NOTE - NSCHARTNOTEFT_GEN_A_CORE
STATUS POST:  Ana schwartz     POST OPERATIVE DAY #: 0    SUBJECTIVE: Pt seen at bedside, resting comfortably. Denies n/v, pain controlled.     Vital Signs Last 24 Hrs  T(C): 36.7 (29 May 2023 18:45), Max: 37.2 (28 May 2023 22:28)  T(F): 98 (29 May 2023 18:45), Max: 99 (28 May 2023 22:28)  HR: 66 (29 May 2023 18:45) (50 - 98)  BP: 139/79 (29 May 2023 18:45) (127/81 - 164/83)  BP(mean): 108 (29 May 2023 17:00) (96 - 108)  RR: 18 (29 May 2023 18:45) (14 - 18)  SpO2: 94% (29 May 2023 18:45) (91% - 99%)    Parameters below as of 29 May 2023 18:45  Patient On (Oxygen Delivery Method): nasal cannula      I&O's Summary    28 May 2023 07:  -  29 May 2023 07:00  --------------------------------------------------------  IN: 900 mL / OUT: 800 mL / NET: 100 mL    29 May 2023 07:  -  29 May 2023 20:17  --------------------------------------------------------  IN: 0 mL / OUT: 600 mL / NET: -600 mL      I&O's Detail    28 May 2023 07:  -  29 May 2023 07:00  --------------------------------------------------------  IN:    Lactated Ringers: 900 mL  Total IN: 900 mL    OUT:    Voided (mL): 800 mL  Total OUT: 800 mL    Total NET: 100 mL      29 May 2023 07:01  -  29 May 2023 20:17  --------------------------------------------------------  IN:  Total IN: 0 mL    OUT:    Oral Fluid: 0 mL    Voided (mL): 600 mL  Total OUT: 600 mL    Total NET: -600 mL          MEDICATIONS  (STANDING):  acetaminophen     Tablet .. 975 milliGRAM(s) Oral every 6 hours  enoxaparin Injectable 40 milliGRAM(s) SubCutaneous every 24 hours  levETIRAcetam 500 milliGRAM(s) Oral two times a day    MEDICATIONS  (PRN):  oxyCODONE    IR 5 milliGRAM(s) Oral every 4 hours PRN Moderate Pain (4 - 6)      LABS:                        12.6   5.58  )-----------( 186      ( 29 May 2023 07:12 )             36.7         141  |  107  |  12  ----------------------------<  90  3.7   |  23  |  0.95    Ca    9.0      29 May 2023 07:10  Phos  3.7       Mg     1.8         TPro  6.2  /  Alb  4.0  /  TBili  0.9  /  DBili  x   /  AST  13  /  ALT  19  /  AlkPhos  61      PT/INR - ( 28 May 2023 04:56 )   PT: 12.5 sec;   INR: 1.08 ratio         PTT - ( 28 May 2023 04:56 )  PTT:32.0 sec  Urinalysis Basic - ( 28 May 2023 06:20 )    Color: Light Yellow / Appearance: Clear / S.019 / pH: x  Gluc: x / Ketone: Negative  / Bili: Negative / Urobili: Negative   Blood: x / Protein: Negative / Nitrite: Negative   Leuk Esterase: Negative / RBC: x / WBC x   Sq Epi: x / Non Sq Epi: x / Bacteria: x          Gen: AAOx3, non-toxic  Head: NCAT  HEENT: EOMI, oral mucosa moist, normal conjunctiva  Lung: Breathing on RA, unlabored   Abd: soft, NTND, no guarding.   MSK: no visible deformities  Neuro: No focal sensory or motor deficits      A/P: 53y Male s/p lap lana   - Reg diet  - DVT ppx  - D/C planning  - Pain ctrl prn

## 2023-05-29 NOTE — PRE-ANESTHESIA EVALUATION ADULT - NSANTHOSAYNRD_GEN_A_CORE
No. LIUDMILA screening performed.  STOP BANG Legend: 0-2 = LOW Risk; 3-4 = INTERMEDIATE Risk; 5-8 = HIGH Risk
No. LIUDMILA screening performed.  STOP BANG Legend: 0-2 = LOW Risk; 3-4 = INTERMEDIATE Risk; 5-8 = HIGH Risk

## 2023-05-30 ENCOUNTER — TRANSCRIPTION ENCOUNTER (OUTPATIENT)
Age: 53
End: 2023-05-30

## 2023-05-30 VITALS
SYSTOLIC BLOOD PRESSURE: 104 MMHG | TEMPERATURE: 98 F | DIASTOLIC BLOOD PRESSURE: 64 MMHG | OXYGEN SATURATION: 95 % | HEART RATE: 61 BPM | RESPIRATION RATE: 18 BRPM

## 2023-05-30 LAB
ALBUMIN SERPL ELPH-MCNC: 4.1 G/DL — SIGNIFICANT CHANGE UP (ref 3.3–5)
ALP SERPL-CCNC: 65 U/L — SIGNIFICANT CHANGE UP (ref 40–120)
ALT FLD-CCNC: 30 U/L — SIGNIFICANT CHANGE UP (ref 10–45)
ANION GAP SERPL CALC-SCNC: 15 MMOL/L — SIGNIFICANT CHANGE UP (ref 5–17)
AST SERPL-CCNC: 25 U/L — SIGNIFICANT CHANGE UP (ref 10–40)
BILIRUB SERPL-MCNC: 0.7 MG/DL — SIGNIFICANT CHANGE UP (ref 0.2–1.2)
BUN SERPL-MCNC: 14 MG/DL — SIGNIFICANT CHANGE UP (ref 7–23)
CALCIUM SERPL-MCNC: 9.2 MG/DL — SIGNIFICANT CHANGE UP (ref 8.4–10.5)
CHLORIDE SERPL-SCNC: 104 MMOL/L — SIGNIFICANT CHANGE UP (ref 96–108)
CO2 SERPL-SCNC: 22 MMOL/L — SIGNIFICANT CHANGE UP (ref 22–31)
CREAT SERPL-MCNC: 1.06 MG/DL — SIGNIFICANT CHANGE UP (ref 0.5–1.3)
EGFR: 83 ML/MIN/1.73M2 — SIGNIFICANT CHANGE UP
GLUCOSE SERPL-MCNC: 121 MG/DL — HIGH (ref 70–99)
HCT VFR BLD CALC: 38.4 % — LOW (ref 39–50)
HGB BLD-MCNC: 13.4 G/DL — SIGNIFICANT CHANGE UP (ref 13–17)
MAGNESIUM SERPL-MCNC: 2.1 MG/DL — SIGNIFICANT CHANGE UP (ref 1.6–2.6)
MCHC RBC-ENTMCNC: 30.4 PG — SIGNIFICANT CHANGE UP (ref 27–34)
MCHC RBC-ENTMCNC: 34.9 GM/DL — SIGNIFICANT CHANGE UP (ref 32–36)
MCV RBC AUTO: 87.1 FL — SIGNIFICANT CHANGE UP (ref 80–100)
NRBC # BLD: 0 /100 WBCS — SIGNIFICANT CHANGE UP (ref 0–0)
PHOSPHATE SERPL-MCNC: 3.8 MG/DL — SIGNIFICANT CHANGE UP (ref 2.5–4.5)
PLATELET # BLD AUTO: 221 K/UL — SIGNIFICANT CHANGE UP (ref 150–400)
POTASSIUM SERPL-MCNC: 3.9 MMOL/L — SIGNIFICANT CHANGE UP (ref 3.5–5.3)
POTASSIUM SERPL-SCNC: 3.9 MMOL/L — SIGNIFICANT CHANGE UP (ref 3.5–5.3)
PROT SERPL-MCNC: 6.9 G/DL — SIGNIFICANT CHANGE UP (ref 6–8.3)
RBC # BLD: 4.41 M/UL — SIGNIFICANT CHANGE UP (ref 4.2–5.8)
RBC # FLD: 11.9 % — SIGNIFICANT CHANGE UP (ref 10.3–14.5)
SODIUM SERPL-SCNC: 141 MMOL/L — SIGNIFICANT CHANGE UP (ref 135–145)
WBC # BLD: 11.04 K/UL — HIGH (ref 3.8–10.5)
WBC # FLD AUTO: 11.04 K/UL — HIGH (ref 3.8–10.5)

## 2023-05-30 PROCEDURE — 85018 HEMOGLOBIN: CPT

## 2023-05-30 PROCEDURE — 86900 BLOOD TYPING SEROLOGIC ABO: CPT

## 2023-05-30 PROCEDURE — 71045 X-RAY EXAM CHEST 1 VIEW: CPT

## 2023-05-30 PROCEDURE — 83690 ASSAY OF LIPASE: CPT

## 2023-05-30 PROCEDURE — 94640 AIRWAY INHALATION TREATMENT: CPT

## 2023-05-30 PROCEDURE — 83735 ASSAY OF MAGNESIUM: CPT

## 2023-05-30 PROCEDURE — C1889: CPT

## 2023-05-30 PROCEDURE — 81003 URINALYSIS AUTO W/O SCOPE: CPT

## 2023-05-30 PROCEDURE — 85610 PROTHROMBIN TIME: CPT

## 2023-05-30 PROCEDURE — 82803 BLOOD GASES ANY COMBINATION: CPT

## 2023-05-30 PROCEDURE — 85025 COMPLETE CBC W/AUTO DIFF WBC: CPT

## 2023-05-30 PROCEDURE — 85027 COMPLETE CBC AUTOMATED: CPT

## 2023-05-30 PROCEDURE — 36415 COLL VENOUS BLD VENIPUNCTURE: CPT

## 2023-05-30 PROCEDURE — 86850 RBC ANTIBODY SCREEN: CPT

## 2023-05-30 PROCEDURE — 83605 ASSAY OF LACTIC ACID: CPT

## 2023-05-30 PROCEDURE — 86901 BLOOD TYPING SEROLOGIC RH(D): CPT

## 2023-05-30 PROCEDURE — 99285 EMERGENCY DEPT VISIT HI MDM: CPT

## 2023-05-30 PROCEDURE — 82435 ASSAY OF BLOOD CHLORIDE: CPT

## 2023-05-30 PROCEDURE — 84132 ASSAY OF SERUM POTASSIUM: CPT

## 2023-05-30 PROCEDURE — 84484 ASSAY OF TROPONIN QUANT: CPT

## 2023-05-30 PROCEDURE — 80053 COMPREHEN METABOLIC PANEL: CPT

## 2023-05-30 PROCEDURE — 85014 HEMATOCRIT: CPT

## 2023-05-30 PROCEDURE — 82947 ASSAY GLUCOSE BLOOD QUANT: CPT

## 2023-05-30 PROCEDURE — 88304 TISSUE EXAM BY PATHOLOGIST: CPT

## 2023-05-30 PROCEDURE — 85730 THROMBOPLASTIN TIME PARTIAL: CPT

## 2023-05-30 PROCEDURE — 84295 ASSAY OF SERUM SODIUM: CPT

## 2023-05-30 PROCEDURE — 84100 ASSAY OF PHOSPHORUS: CPT

## 2023-05-30 PROCEDURE — 76705 ECHO EXAM OF ABDOMEN: CPT

## 2023-05-30 PROCEDURE — 82330 ASSAY OF CALCIUM: CPT

## 2023-05-30 RX ORDER — ALBUTEROL 90 UG/1
1 AEROSOL, METERED ORAL EVERY 6 HOURS
Refills: 0 | Status: DISCONTINUED | OUTPATIENT
Start: 2023-05-30 | End: 2023-05-30

## 2023-05-30 RX ORDER — OXYCODONE HYDROCHLORIDE 5 MG/1
1 TABLET ORAL
Qty: 8 | Refills: 0
Start: 2023-05-30 | End: 2023-05-31

## 2023-05-30 RX ORDER — ACETAMINOPHEN 500 MG
3 TABLET ORAL
Qty: 0 | Refills: 0 | DISCHARGE
Start: 2023-05-30

## 2023-05-30 RX ORDER — IPRATROPIUM/ALBUTEROL SULFATE 18-103MCG
3 AEROSOL WITH ADAPTER (GRAM) INHALATION ONCE
Refills: 0 | Status: COMPLETED | OUTPATIENT
Start: 2023-05-30 | End: 2023-05-30

## 2023-05-30 RX ORDER — IPRATROPIUM/ALBUTEROL SULFATE 18-103MCG
3 AEROSOL WITH ADAPTER (GRAM) INHALATION EVERY 6 HOURS
Refills: 0 | Status: DISCONTINUED | OUTPATIENT
Start: 2023-05-30 | End: 2023-05-30

## 2023-05-30 RX ADMIN — Medication 3 MILLILITER(S): at 01:21

## 2023-05-30 RX ADMIN — LEVETIRACETAM 500 MILLIGRAM(S): 250 TABLET, FILM COATED ORAL at 05:28

## 2023-05-30 RX ADMIN — Medication 975 MILLIGRAM(S): at 05:58

## 2023-05-30 RX ADMIN — Medication 975 MILLIGRAM(S): at 05:28

## 2023-05-30 RX ADMIN — ENOXAPARIN SODIUM 40 MILLIGRAM(S): 100 INJECTION SUBCUTANEOUS at 05:29

## 2023-05-30 NOTE — DISCHARGE NOTE PROVIDER - CARE PROVIDER_API CALL
Mehdi Hernandez  Surgery  [address] [place] [address], [facility] [address]  [place] [address]  Phone: [phone]  Fax: [phone]  Follow Up Time: 2 weeks

## 2023-05-30 NOTE — DISCHARGE NOTE PROVIDER - NSDCFUSCHEDAPPT_GEN_ALL_CORE_FT
[facility]  [address]  Scheduled Appointment: 06/18/2023    Evgeny Verdin  [facility]  [address] [facility]  Scheduled Appointment: 06/27/2023

## 2023-05-30 NOTE — DISCHARGE NOTE NURSING/CASE MANAGEMENT/SOCIAL WORK - NSDCPEFALRISK_GEN_ALL_CORE
For information on Fall & Injury Prevention, visit: [email or web address] OR  [email or web address] OR  https://www.cdc.gov/steadi/patient.html

## 2023-05-30 NOTE — PROGRESS NOTE ADULT - ASSESSMENT
53M w/ PMHx of seizure disorder on Keppra presenting with clinical acute cholecystitis. Now POD1 s/p lap cholecystectomy.    - Diet: Regular   - Pain control  - Encourage IS/OOB  - Dispo: Anticipate no needs    #pager [phone]  Trauma Surgery  53M w/ PMHx of seizure disorder on Keppra presenting with clinical acute cholecystitis. Now POD1 s/p lap cholecystectomy.    - Diet: Regular   - Pain control  - Encourage IS/OOB  - Dispo: Anticipate no needs- will be discharged today to home    #pager [phone]  Trauma Surgery

## 2023-05-30 NOTE — DISCHARGE NOTE PROVIDER - NSDCCPCAREPLAN_GEN_ALL_CORE_FT
PRINCIPAL DISCHARGE DIAGNOSIS  Diagnosis: Cholecystitis, unspecified  Assessment and Plan of Treatment: WOUND CARE: You may remove the clear dressing, underneath you will find steri strips resembling tape. Please leave these on. If they come off on their own- that is okay. Otherwise, your surgeon will remove them in the office.   PAIN: You may take over the counter pain medication like tylenol 1000mg every 6 hours, but do not take tylenol with Oxycodone as you may exceed maximum dosage. Only take Oxycodone for extreme pain.  BATHING: You may shower and/or sponge bathe.  ACTIVITY: No heavy lifting anything more than 10-15lbs or straining. Otherwise, you may return to your usual level of physical activity. If you are taking narcotic pain medication (such as Percocet), do NOT drive a car, operate machinery or make important decisions.  NOTIFY YOUR SURGEON IF: You have any bleeding that does not stop, any fever (over 100.4 F) or chills, persistent nausea/vomiting with inability to tolerate food or liquids, persistent diarrhea, or if your pain is not controlled on your discharge pain medications.  FOLLOW-UP:  Please follow up with  in one week regarding your hospitalization.      SECONDARY DISCHARGE DIAGNOSES  Diagnosis: Seizure  Assessment and Plan of Treatment: Continue Keppra.  Follow up with your PCP outpatient

## 2023-05-30 NOTE — DISCHARGE NOTE PROVIDER - HOSPITAL COURSE
53M w/ PMHx of meningioma s/p resection and seizure disorder on Keppra, open appendectomy and Right inguinal hernia repair who presented to the ED with a 2 week history of right sided abdominal pain. In the ED, patient was afebrile, hemodynamically stable, labs largely unremarkable, RUQ US revealed a large mobile gallstone within the gallbladder and positive Samuels's sign. Surgery consulted for evaluation. Patient admitted to surgery and underwent laparoscopic cholecystectomy on 5/29.   In the PACU, the patients' pain was controlled, vitals stable, tolerating PO, and voiding appropriately.  The patient was transferred to the surgical floor in stable condition. On day of discharge, the patient was tolerating diet, ambulating well and pain controlled. All questions were answered and patient safely discharged home.

## 2023-05-30 NOTE — PROGRESS NOTE ADULT - SUBJECTIVE AND OBJECTIVE BOX
SURGERY DAILY PROGRESS NOTE:     24h events: s/p lap lana  SUBJECTIVE/ROS: Patient feels well.   Denies nausea, vomiting, chest pain, shortness of breath     MEDICATIONS  (STANDING):  acetaminophen     Tablet .. 975 milliGRAM(s) Oral every 6 hours  enoxaparin Injectable 40 milliGRAM(s) SubCutaneous every 24 hours  levETIRAcetam 500 milliGRAM(s) Oral two times a day    MEDICATIONS  (PRN):  oxyCODONE    IR 5 milliGRAM(s) Oral every 4 hours PRN Moderate Pain (4 - 6)      OBJECTIVE:  Vital Signs Last 24 Hrs  T(C): 36.9 (30 May 2023 04:21), Max: 37 (29 May 2023 17:45)  T(F): 98.5 (30 May 2023 04:21), Max: 98.6 (29 May 2023 17:45)  HR: 65 (30 May 2023 04:21) (58 - 98)  BP: 109/57 (30 May 2023 04:21) (107/66 - 154/88)  BP(mean): 108 (29 May 2023 17:00) (96 - 108)  RR: 18 (30 May 2023 04:21) (14 - 18)  SpO2: 94% (30 May 2023 04:21) (91% - 96%)    Parameters below as of 30 May 2023 04:21  Patient On (Oxygen Delivery Method): nasal cannula  O2 Flow (L/min): 2    I&O's Detail  29 May 2023 07:01  -  30 May 2023 07:00  --------------------------------------------------------  IN:  Total IN: 0 mL    OUT:    Oral Fluid: 0 mL    Voided (mL): 1750 mL  Total OUT: 1750 mL  Total NET: -1750 mL    Daily Height in cm: 162.56 (29 May 2023 13:28)    Daily     LABS:                        12.6   5.58  )-----------( 186      ( 29 May 2023 07:12 )             36.7     05-29    141  |  107  |  12  ----------------------------<  90  3.7   |  23  |  0.95    Ca    9.0      29 May 2023 07:10  Phos  3.7     05-29  Mg     1.8     05-29    TPro  6.2  /  Alb  4.0  /  TBili  0.9  /  DBili  x   /  AST  13  /  ALT  19  /  AlkPhos  61  05-29      Gen: AAOx3, non-toxic  Head: NCAT  HEENT: EOMI, oral mucosa moist, normal conjunctiva  Lung: Breathing on RA, unlabored   Abd: soft, NTND, no guarding.   MSK: no visible deformities  Neuro: No focal sensory or motor deficits       SURGERY DAILY PROGRESS NOTE:     24h events: s/p lap lana  SUBJECTIVE/ROS: Patient feels well. Some tenderness to palpation of abdomen. Denies nausea, vomiting, chest pain, shortness of breath.     MEDICATIONS  (STANDING):  acetaminophen     Tablet .. 975 milliGRAM(s) Oral every 6 hours  enoxaparin Injectable 40 milliGRAM(s) SubCutaneous every 24 hours  levETIRAcetam 500 milliGRAM(s) Oral two times a day    MEDICATIONS  (PRN):  oxyCODONE    IR 5 milliGRAM(s) Oral every 4 hours PRN Moderate Pain (4 - 6)      OBJECTIVE:  Vital Signs Last 24 Hrs  T(C): 36.9 (30 May 2023 04:21), Max: 37 (29 May 2023 17:45)  T(F): 98.5 (30 May 2023 04:21), Max: 98.6 (29 May 2023 17:45)  HR: 65 (30 May 2023 04:21) (58 - 98)  BP: 109/57 (30 May 2023 04:21) (107/66 - 154/88)  BP(mean): 108 (29 May 2023 17:00) (96 - 108)  RR: 18 (30 May 2023 04:21) (14 - 18)  SpO2: 94% (30 May 2023 04:21) (91% - 96%)    Parameters below as of 30 May 2023 04:21  Patient On (Oxygen Delivery Method): nasal cannula  O2 Flow (L/min): 2    I&O's Detail  29 May 2023 07:01  -  30 May 2023 07:00  --------------------------------------------------------  IN:  Total IN: 0 mL    OUT:    Oral Fluid: 0 mL    Voided (mL): 1750 mL  Total OUT: 1750 mL  Total NET: -1750 mL    Daily Height in cm: 162.56 (29 May 2023 13:28)    Daily     LABS:                        12.6   5.58  )-----------( 186      ( 29 May 2023 07:12 )             36.7     05-29    141  |  107  |  12  ----------------------------<  90  3.7   |  23  |  0.95    Ca    9.0      29 May 2023 07:10  Phos  3.7     05-29  Mg     1.8     05-29    TPro  6.2  /  Alb  4.0  /  TBili  0.9  /  DBili  x   /  AST  13  /  ALT  19  /  AlkPhos  61  05-29      Gen: AAOx3, non-toxic  Head: NCAT  HEENT: EOMI, oral mucosa moist, normal conjunctiva  Lung: Breathing on RA, unlabored   Abd: soft, some tenderness to palpation, no guarding.   MSK: no visible deformities  Neuro: No focal sensory or motor deficits

## 2023-05-30 NOTE — DISCHARGE NOTE NURSING/CASE MANAGEMENT/SOCIAL WORK - PATIENT PORTAL LINK FT
You can access the FollowMyHealth Patient Portal offered by [facility] by registering at the following website: [email or web address]. By joining [employer]s FollowMyHealth portal, you will also be able to view your health information using other applications (apps) compatible with our system.

## 2023-05-30 NOTE — DISCHARGE NOTE PROVIDER - NSDCMRMEDTOKEN_GEN_ALL_CORE_FT
acetaminophen 325 mg oral tablet: 3 tab(s) orally every 6 hours  levETIRAcetam 500 mg oral tablet: 1 tab(s) orally 2 times a day  oxyCODONE 5 mg oral tablet: 1 tab(s) orally every 6 hours as needed for  severe pain MDD: 4

## 2023-05-31 ENCOUNTER — TRANSCRIPTION ENCOUNTER (OUTPATIENT)
Age: 53
End: 2023-05-31

## 2023-06-07 LAB — SURGICAL PATHOLOGY STUDY: SIGNIFICANT CHANGE UP

## 2023-06-12 ENCOUNTER — APPOINTMENT (OUTPATIENT)
Dept: TRAUMA SURGERY | Facility: CLINIC | Age: 53
End: 2023-06-12
Payer: COMMERCIAL

## 2023-06-12 PROCEDURE — 99024 POSTOP FOLLOW-UP VISIT: CPT

## 2023-06-12 NOTE — ASSESSMENT
[FreeTextEntry1] : patient is doing well s/p laparoscopic cholecystectomy\par no complaints\par on exam the abdomen is soft non tender and not distended\par the incisions are well healed\par to follow up as needed

## 2023-06-27 ENCOUNTER — APPOINTMENT (OUTPATIENT)
Dept: INTERNAL MEDICINE | Facility: CLINIC | Age: 53
End: 2023-06-27
Payer: COMMERCIAL

## 2023-06-27 VITALS
DIASTOLIC BLOOD PRESSURE: 90 MMHG | BODY MASS INDEX: 31.58 KG/M2 | HEIGHT: 64 IN | TEMPERATURE: 97.9 F | HEART RATE: 54 BPM | WEIGHT: 185 LBS | OXYGEN SATURATION: 97 % | SYSTOLIC BLOOD PRESSURE: 150 MMHG

## 2023-06-27 DIAGNOSIS — Z00.00 ENCOUNTER FOR GENERAL ADULT MEDICAL EXAMINATION W/OUT ABNORMAL FINDINGS: ICD-10-CM

## 2023-06-27 DIAGNOSIS — G40.909 EPILEPSY, UNSPECIFIED, NOT INTRACTABLE, W/OUT STATUS EPILEPTICUS: ICD-10-CM

## 2023-06-27 PROCEDURE — 99396 PREV VISIT EST AGE 40-64: CPT | Mod: 25

## 2023-06-27 PROCEDURE — 36415 COLL VENOUS BLD VENIPUNCTURE: CPT

## 2023-06-27 RX ORDER — ACETAMINOPHEN AND CODEINE PHOSPHATE 300; 15 MG/1; MG/1
300-15 TABLET ORAL
Qty: 30 | Refills: 0 | Status: DISCONTINUED | COMMUNITY
Start: 2023-05-25 | End: 2023-06-27

## 2023-06-27 RX ORDER — MELOXICAM 15 MG/1
15 TABLET ORAL
Qty: 1 | Refills: 0 | Status: DISCONTINUED | COMMUNITY
Start: 2023-05-24 | End: 2023-06-27

## 2023-06-29 NOTE — ASSESSMENT
[FreeTextEntry1] : Blood work done today, seizures are well controlled, wounds appear clean dry and intact.  Patient interested in GLP-1 agonist did discuss monitor indication and medullary thyroid carcinoma advised to follow-up with father in regards to type of thyroid cancer.

## 2023-07-03 ENCOUNTER — RX RENEWAL (OUTPATIENT)
Age: 53
End: 2023-07-03

## 2023-07-11 ENCOUNTER — TRANSCRIPTION ENCOUNTER (OUTPATIENT)
Age: 53
End: 2023-07-11

## 2023-07-12 ENCOUNTER — APPOINTMENT (OUTPATIENT)
Dept: NEUROLOGY | Facility: CLINIC | Age: 53
End: 2023-07-12

## 2023-07-25 ENCOUNTER — APPOINTMENT (OUTPATIENT)
Dept: NEUROLOGY | Facility: CLINIC | Age: 53
End: 2023-07-25
Payer: COMMERCIAL

## 2023-07-25 VITALS
OXYGEN SATURATION: 97 % | TEMPERATURE: 98.6 F | RESPIRATION RATE: 16 BRPM | SYSTOLIC BLOOD PRESSURE: 138 MMHG | HEART RATE: 64 BPM | BODY MASS INDEX: 31.58 KG/M2 | WEIGHT: 185 LBS | HEIGHT: 64 IN | DIASTOLIC BLOOD PRESSURE: 87 MMHG

## 2023-07-25 PROCEDURE — 99205 OFFICE O/P NEW HI 60 MIN: CPT

## 2023-07-25 RX ORDER — MELOXICAM 15 MG/1
15 TABLET ORAL
Qty: 30 | Refills: 0 | Status: DISCONTINUED | COMMUNITY
Start: 2022-07-01 | End: 2023-07-25

## 2023-07-25 NOTE — HISTORY OF PRESENT ILLNESS
[FreeTextEntry1] : Mr. Villafana is here for evaluation of a history of "meningioma."\par \par Patient is referred by his epileptologist, Dr. Miranda.  Patient reports that he was living in [place] in 2012 and began to develop feelings of right sided weakness - initially affecting his LE and then his UE - he sought multiple opinions but etiology was unclear until one day when he developed a sensation of numbness which prompted his doctor to send him the ED. He underwent resection of the mass (I currently do not have original films or pathology report) - post-op course was characterized by staph infection and cranioplasty with seizures. He was started on Keppra and was in rehab for some time but did make a near complete recovery.\par \par He does not have a copy of the pathology report with him but will send to me when he is home. He has no adjuvant therapy.\par \par He returned to [place] and was seeing an outside neurologist who felt it was reasonable to taper him off of the Keppra.  In 2014, he was in a spin class at the gym and had a sense of tunnel vision and woke up in an ambulance. He was restarted on Keppra at that time and remains on Keppra 500 mg bid.\par \par He has no new issues to report - he has no headache - no change in his walking - he practices martial arts and reports good balance. 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

## 2023-07-26 ENCOUNTER — TRANSCRIPTION ENCOUNTER (OUTPATIENT)
Age: 53
End: 2023-07-26

## 2023-07-27 ENCOUNTER — TRANSCRIPTION ENCOUNTER (OUTPATIENT)
Age: 53
End: 2023-07-27

## 2023-07-27 LAB
25(OH)D3 SERPL-MCNC: 19.4 NG/ML
ALBUMIN SERPL ELPH-MCNC: 4.4 G/DL
ALP BLD-CCNC: 78 U/L
ALT SERPL-CCNC: 28 U/L
ANION GAP SERPL CALC-SCNC: 14 MMOL/L
APPEARANCE: CLEAR
AST SERPL-CCNC: 24 U/L
BILIRUB SERPL-MCNC: 0.8 MG/DL
BILIRUBIN URINE: NEGATIVE
BLOOD URINE: NEGATIVE
BUN SERPL-MCNC: 14 MG/DL
CALCIUM SERPL-MCNC: 9.4 MG/DL
CHLORIDE SERPL-SCNC: 105 MMOL/L
CHOLEST SERPL-MCNC: 217 MG/DL
CO2 SERPL-SCNC: 20 MMOL/L
COLOR: YELLOW
CREAT SERPL-MCNC: 0.96 MG/DL
EGFR: 93 ML/MIN/1.73M2
ESTIMATED AVERAGE GLUCOSE: 123 MG/DL
GLUCOSE QUALITATIVE U: NEGATIVE MG/DL
GLUCOSE SERPL-MCNC: 108 MG/DL
HBA1C MFR BLD HPLC: 5.9 %
HDLC SERPL-MCNC: 40 MG/DL
KETONES URINE: NEGATIVE MG/DL
LDLC SERPL CALC-MCNC: 149 MG/DL
LEUKOCYTE ESTERASE URINE: NEGATIVE
LEVETIRACETAM SERPL-MCNC: 9.1 UG/ML
NITRITE URINE: NEGATIVE
NONHDLC SERPL-MCNC: 177 MG/DL
PH URINE: 5.5
POTASSIUM SERPL-SCNC: 4.2 MMOL/L
PROT SERPL-MCNC: 6.7 G/DL
PROTEIN URINE: NEGATIVE MG/DL
PSA SERPL-MCNC: 3.6 NG/ML
SODIUM SERPL-SCNC: 139 MMOL/L
SPECIFIC GRAVITY URINE: 1.02
TRIGL SERPL-MCNC: 156 MG/DL
TSH SERPL-ACNC: 1.04 UIU/ML
UROBILINOGEN URINE: 0.2 MG/DL
VIT B12 SERPL-MCNC: 475 PG/ML

## 2023-08-08 ENCOUNTER — TRANSCRIPTION ENCOUNTER (OUTPATIENT)
Age: 53
End: 2023-08-08

## 2023-08-10 ENCOUNTER — APPOINTMENT (OUTPATIENT)
Dept: UROLOGY | Facility: CLINIC | Age: 53
End: 2023-08-10
Payer: COMMERCIAL

## 2023-08-10 VITALS
HEART RATE: 57 BPM | TEMPERATURE: 97.8 F | DIASTOLIC BLOOD PRESSURE: 85 MMHG | WEIGHT: 185 LBS | HEIGHT: 64 IN | OXYGEN SATURATION: 97 % | BODY MASS INDEX: 31.58 KG/M2 | SYSTOLIC BLOOD PRESSURE: 130 MMHG

## 2023-08-10 DIAGNOSIS — N40.0 BENIGN PROSTATIC HYPERPLASIA WITHOUT LOWER URINARY TRACT SYMPMS: ICD-10-CM

## 2023-08-10 DIAGNOSIS — N52.9 MALE ERECTILE DYSFUNCTION, UNSPECIFIED: ICD-10-CM

## 2023-08-10 PROCEDURE — 99214 OFFICE O/P EST MOD 30 MIN: CPT

## 2023-08-10 NOTE — HISTORY OF PRESENT ILLNESS
[FreeTextEntry1] : Elevated PSA  Patient is here with an elevated PSA. He has no personal history  of prostate cancer.. father diagnosed at age 70 s/p ralpHe has no prior genitourinary history of hematuria, hematospermia, prostatitis, UTI, erectile dysfunction, urolithiasis, epididymal orchitis.  No dysuria or hematuria voiding well on daily tadalafil  erections not great

## 2023-08-11 ENCOUNTER — TRANSCRIPTION ENCOUNTER (OUTPATIENT)
Age: 53
End: 2023-08-11

## 2023-08-17 ENCOUNTER — TRANSCRIPTION ENCOUNTER (OUTPATIENT)
Age: 53
End: 2023-08-17

## 2023-08-18 ENCOUNTER — TRANSCRIPTION ENCOUNTER (OUTPATIENT)
Age: 53
End: 2023-08-18

## 2023-08-21 ENCOUNTER — NON-APPOINTMENT (OUTPATIENT)
Age: 53
End: 2023-08-21

## 2023-09-07 ENCOUNTER — OUTPATIENT (OUTPATIENT)
Dept: OUTPATIENT SERVICES | Facility: HOSPITAL | Age: 53
LOS: 1 days | End: 2023-09-07
Payer: COMMERCIAL

## 2023-09-07 ENCOUNTER — APPOINTMENT (OUTPATIENT)
Dept: SLEEP CENTER | Facility: CLINIC | Age: 53
End: 2023-09-07
Payer: COMMERCIAL

## 2023-09-07 DIAGNOSIS — Z90.49 ACQUIRED ABSENCE OF OTHER SPECIFIED PARTS OF DIGESTIVE TRACT: Chronic | ICD-10-CM

## 2023-09-07 DIAGNOSIS — Z98.890 OTHER SPECIFIED POSTPROCEDURAL STATES: Chronic | ICD-10-CM

## 2023-09-07 PROCEDURE — 95811 POLYSOM 6/>YRS CPAP 4/> PARM: CPT | Mod: 26

## 2023-09-07 PROCEDURE — 95811 POLYSOM 6/>YRS CPAP 4/> PARM: CPT

## 2023-09-08 ENCOUNTER — OUTPATIENT (OUTPATIENT)
Dept: OUTPATIENT SERVICES | Facility: HOSPITAL | Age: 53
LOS: 1 days | End: 2023-09-08
Payer: COMMERCIAL

## 2023-09-08 ENCOUNTER — RESULT REVIEW (OUTPATIENT)
Age: 53
End: 2023-09-08

## 2023-09-08 ENCOUNTER — APPOINTMENT (OUTPATIENT)
Dept: MRI IMAGING | Facility: IMAGING CENTER | Age: 53
End: 2023-09-08
Payer: COMMERCIAL

## 2023-09-08 DIAGNOSIS — R97.20 ELEVATED PROSTATE SPECIFIC ANTIGEN [PSA]: ICD-10-CM

## 2023-09-08 DIAGNOSIS — Z90.49 ACQUIRED ABSENCE OF OTHER SPECIFIED PARTS OF DIGESTIVE TRACT: Chronic | ICD-10-CM

## 2023-09-08 DIAGNOSIS — Z98.890 OTHER SPECIFIED POSTPROCEDURAL STATES: Chronic | ICD-10-CM

## 2023-09-08 PROCEDURE — 76498P: CUSTOM | Mod: 26

## 2023-09-08 PROCEDURE — 72197 MRI PELVIS W/O & W/DYE: CPT

## 2023-09-08 PROCEDURE — 76498 UNLISTED MR PROCEDURE: CPT

## 2023-09-08 PROCEDURE — 72197 MRI PELVIS W/O & W/DYE: CPT | Mod: 26

## 2023-09-08 PROCEDURE — A9585: CPT

## 2023-09-20 ENCOUNTER — NON-APPOINTMENT (OUTPATIENT)
Age: 53
End: 2023-09-20

## 2023-09-22 ENCOUNTER — APPOINTMENT (OUTPATIENT)
Dept: UROLOGY | Facility: CLINIC | Age: 53
End: 2023-09-22
Payer: COMMERCIAL

## 2023-09-22 VITALS
SYSTOLIC BLOOD PRESSURE: 148 MMHG | HEART RATE: 60 BPM | OXYGEN SATURATION: 98 % | TEMPERATURE: 97.9 F | HEIGHT: 64 IN | DIASTOLIC BLOOD PRESSURE: 88 MMHG | BODY MASS INDEX: 31.58 KG/M2 | WEIGHT: 185 LBS

## 2023-09-22 DIAGNOSIS — R97.20 ELEVATED PROSTATE, SPECIFIC ANTIGEN [PSA]: ICD-10-CM

## 2023-09-22 PROCEDURE — 99213 OFFICE O/P EST LOW 20 MIN: CPT

## 2023-09-25 DIAGNOSIS — G47.33 OBSTRUCTIVE SLEEP APNEA (ADULT) (PEDIATRIC): ICD-10-CM

## 2023-10-30 ENCOUNTER — TRANSCRIPTION ENCOUNTER (OUTPATIENT)
Age: 53
End: 2023-10-30

## 2023-10-31 ENCOUNTER — TRANSCRIPTION ENCOUNTER (OUTPATIENT)
Age: 53
End: 2023-10-31

## 2023-11-01 ENCOUNTER — TRANSCRIPTION ENCOUNTER (OUTPATIENT)
Age: 53
End: 2023-11-01

## 2023-11-03 ENCOUNTER — APPOINTMENT (OUTPATIENT)
Dept: INTERNAL MEDICINE | Facility: CLINIC | Age: 53
End: 2023-11-03

## 2023-12-21 ENCOUNTER — RX RENEWAL (OUTPATIENT)
Age: 53
End: 2023-12-21

## 2024-04-10 ENCOUNTER — RX RENEWAL (OUTPATIENT)
Age: 54
End: 2024-04-10

## 2024-06-21 ENCOUNTER — TRANSCRIPTION ENCOUNTER (OUTPATIENT)
Age: 54
End: 2024-06-21

## 2024-06-21 RX ORDER — LEVETIRACETAM 500 MG/1
500 TABLET, FILM COATED ORAL
Qty: 180 | Refills: 1 | Status: ACTIVE | COMMUNITY
Start: 2021-06-11 | End: 1900-01-01

## 2024-06-23 ENCOUNTER — NON-APPOINTMENT (OUTPATIENT)
Age: 54
End: 2024-06-23

## 2024-06-24 ENCOUNTER — TRANSCRIPTION ENCOUNTER (OUTPATIENT)
Age: 54
End: 2024-06-24

## 2024-06-24 ENCOUNTER — APPOINTMENT (OUTPATIENT)
Dept: OTOLARYNGOLOGY | Facility: CLINIC | Age: 54
End: 2024-06-24
Payer: COMMERCIAL

## 2024-06-24 VITALS
HEART RATE: 58 BPM | HEIGHT: 64 IN | WEIGHT: 185 LBS | SYSTOLIC BLOOD PRESSURE: 145 MMHG | DIASTOLIC BLOOD PRESSURE: 87 MMHG | BODY MASS INDEX: 31.58 KG/M2

## 2024-06-24 DIAGNOSIS — H69.93 UNSPECIFIED EUSTACHIAN TUBE DISORDER, BILATERAL: ICD-10-CM

## 2024-06-24 DIAGNOSIS — H93.13 TINNITUS, BILATERAL: ICD-10-CM

## 2024-06-24 DIAGNOSIS — H55.00 UNSPECIFIED NYSTAGMUS: ICD-10-CM

## 2024-06-24 DIAGNOSIS — J31.0 CHRONIC RHINITIS: ICD-10-CM

## 2024-06-24 DIAGNOSIS — R42 DIZZINESS AND GIDDINESS: ICD-10-CM

## 2024-06-24 DIAGNOSIS — H61.21 IMPACTED CERUMEN, RIGHT EAR: ICD-10-CM

## 2024-06-24 PROCEDURE — 92550 TYMPANOMETRY & REFLEX THRESH: CPT

## 2024-06-24 PROCEDURE — 92557 COMPREHENSIVE HEARING TEST: CPT

## 2024-06-24 PROCEDURE — 99214 OFFICE O/P EST MOD 30 MIN: CPT | Mod: 25

## 2024-06-24 PROCEDURE — G0268 REMOVAL OF IMPACTED WAX MD: CPT

## 2024-06-24 RX ORDER — FLUTICASONE PROPIONATE 50 UG/1
50 SPRAY, METERED NASAL
Qty: 3 | Refills: 3 | Status: ACTIVE | COMMUNITY
Start: 2024-06-24 | End: 1900-01-01

## 2024-06-25 ENCOUNTER — NON-APPOINTMENT (OUTPATIENT)
Age: 54
End: 2024-06-25

## 2024-06-28 ENCOUNTER — APPOINTMENT (OUTPATIENT)
Dept: MRI IMAGING | Facility: CLINIC | Age: 54
End: 2024-06-28

## 2024-06-28 PROCEDURE — 70553 MRI BRAIN STEM W/O & W/DYE: CPT

## 2024-06-28 PROCEDURE — A9585: CPT | Mod: JW

## 2024-07-08 ENCOUNTER — APPOINTMENT (OUTPATIENT)
Dept: OTOLARYNGOLOGY | Facility: CLINIC | Age: 54
End: 2024-07-08
Payer: COMMERCIAL

## 2024-07-08 VITALS
BODY MASS INDEX: 31.58 KG/M2 | HEIGHT: 64 IN | SYSTOLIC BLOOD PRESSURE: 134 MMHG | HEART RATE: 65 BPM | DIASTOLIC BLOOD PRESSURE: 81 MMHG | WEIGHT: 185 LBS

## 2024-07-08 DIAGNOSIS — R42 DIZZINESS AND GIDDINESS: ICD-10-CM

## 2024-07-08 PROCEDURE — 99214 OFFICE O/P EST MOD 30 MIN: CPT

## 2024-07-08 RX ORDER — AZELASTINE HYDROCHLORIDE 137 UG/1
0.1 SPRAY, METERED NASAL TWICE DAILY
Qty: 3 | Refills: 3 | Status: ACTIVE | COMMUNITY
Start: 2024-07-08 | End: 1900-01-01

## 2024-07-09 ENCOUNTER — APPOINTMENT (OUTPATIENT)
Dept: UROLOGY | Facility: CLINIC | Age: 54
End: 2024-07-09
Payer: COMMERCIAL

## 2024-07-09 VITALS
DIASTOLIC BLOOD PRESSURE: 85 MMHG | HEART RATE: 60 BPM | HEIGHT: 64 IN | WEIGHT: 185 LBS | BODY MASS INDEX: 31.58 KG/M2 | SYSTOLIC BLOOD PRESSURE: 132 MMHG

## 2024-07-09 DIAGNOSIS — N40.0 BENIGN PROSTATIC HYPERPLASIA WITHOUT LOWER URINARY TRACT SYMPMS: ICD-10-CM

## 2024-07-09 DIAGNOSIS — R97.20 ELEVATED PROSTATE, SPECIFIC ANTIGEN [PSA]: ICD-10-CM

## 2024-07-09 PROCEDURE — 99214 OFFICE O/P EST MOD 30 MIN: CPT

## 2024-07-09 PROCEDURE — G2211 COMPLEX E/M VISIT ADD ON: CPT

## 2024-07-10 ENCOUNTER — NON-APPOINTMENT (OUTPATIENT)
Age: 54
End: 2024-07-10

## 2024-07-10 ENCOUNTER — APPOINTMENT (OUTPATIENT)
Dept: UROLOGY | Facility: CLINIC | Age: 54
End: 2024-07-10

## 2024-07-11 ENCOUNTER — APPOINTMENT (OUTPATIENT)
Dept: NEUROLOGY | Facility: CLINIC | Age: 54
End: 2024-07-11
Payer: COMMERCIAL

## 2024-07-11 VITALS
SYSTOLIC BLOOD PRESSURE: 136 MMHG | DIASTOLIC BLOOD PRESSURE: 84 MMHG | RESPIRATION RATE: 16 BRPM | BODY MASS INDEX: 31.58 KG/M2 | WEIGHT: 185 LBS | HEART RATE: 67 BPM | HEIGHT: 64 IN | OXYGEN SATURATION: 96 %

## 2024-07-11 PROCEDURE — 99215 OFFICE O/P EST HI 40 MIN: CPT

## 2024-07-12 LAB
ANION GAP SERPL CALC-SCNC: 14 MMOL/L
CALCIUM SERPL-MCNC: 9.2 MG/DL
CHLORIDE SERPL-SCNC: 105 MMOL/L
CO2 SERPL-SCNC: 20 MMOL/L
CREAT SERPL-MCNC: 0.93 MG/DL
EGFR: 96 ML/MIN/1.73M2
GLUCOSE SERPL-MCNC: 110 MG/DL
POTASSIUM SERPL-SCNC: 4.2 MMOL/L
PSA SERPL-MCNC: 3.12 NG/ML
SODIUM SERPL-SCNC: 140 MMOL/L

## 2024-07-24 NOTE — PRE-ANESTHESIA EVALUATION ADULT - NSDENTALSD_ENT_ALL_CORE
Medication refill is pending.  Reception has attempted to reach patient several times to schedule an appointment.    2 phone calls  Letter mailed    Patient has not yet scheduled appointment.      appears normal and intact

## 2024-08-27 ENCOUNTER — TRANSCRIPTION ENCOUNTER (OUTPATIENT)
Age: 54
End: 2024-08-27

## 2024-08-29 ENCOUNTER — TRANSCRIPTION ENCOUNTER (OUTPATIENT)
Age: 54
End: 2024-08-29

## 2024-09-16 ENCOUNTER — APPOINTMENT (OUTPATIENT)
Dept: OTOLARYNGOLOGY | Facility: CLINIC | Age: 54
End: 2024-09-16

## 2024-10-13 ENCOUNTER — OUTPATIENT (OUTPATIENT)
Dept: OUTPATIENT SERVICES | Facility: HOSPITAL | Age: 54
LOS: 1 days | End: 2024-10-13
Payer: COMMERCIAL

## 2024-10-13 DIAGNOSIS — Z98.890 OTHER SPECIFIED POSTPROCEDURAL STATES: Chronic | ICD-10-CM

## 2024-10-13 DIAGNOSIS — D32.9 BENIGN NEOPLASM OF MENINGES, UNSPECIFIED: ICD-10-CM

## 2024-10-13 DIAGNOSIS — Z90.49 ACQUIRED ABSENCE OF OTHER SPECIFIED PARTS OF DIGESTIVE TRACT: Chronic | ICD-10-CM

## 2024-10-13 PROCEDURE — A9585: CPT

## 2024-10-13 PROCEDURE — 70553 MRI BRAIN STEM W/O & W/DYE: CPT

## 2024-10-29 ENCOUNTER — APPOINTMENT (OUTPATIENT)
Dept: NEUROLOGY | Facility: CLINIC | Age: 54
End: 2024-10-29
Payer: COMMERCIAL

## 2024-10-29 VITALS
HEIGHT: 64 IN | BODY MASS INDEX: 31.76 KG/M2 | DIASTOLIC BLOOD PRESSURE: 80 MMHG | RESPIRATION RATE: 16 BRPM | TEMPERATURE: 97.8 F | SYSTOLIC BLOOD PRESSURE: 137 MMHG | HEART RATE: 64 BPM | WEIGHT: 186 LBS | OXYGEN SATURATION: 98 %

## 2024-10-29 PROCEDURE — 99215 OFFICE O/P EST HI 40 MIN: CPT

## 2024-11-21 ENCOUNTER — TRANSCRIPTION ENCOUNTER (OUTPATIENT)
Age: 54
End: 2024-11-21

## 2024-11-29 ENCOUNTER — NON-APPOINTMENT (OUTPATIENT)
Age: 54
End: 2024-11-29

## 2025-02-06 ENCOUNTER — APPOINTMENT (OUTPATIENT)
Dept: UROLOGY | Facility: CLINIC | Age: 55
End: 2025-02-06

## 2025-02-06 VITALS
OXYGEN SATURATION: 96 % | TEMPERATURE: 95.7 F | SYSTOLIC BLOOD PRESSURE: 134 MMHG | HEART RATE: 62 BPM | DIASTOLIC BLOOD PRESSURE: 87 MMHG

## 2025-02-06 PROCEDURE — G2211 COMPLEX E/M VISIT ADD ON: CPT | Mod: NC

## 2025-02-06 PROCEDURE — 99214 OFFICE O/P EST MOD 30 MIN: CPT

## 2025-02-07 LAB
ANION GAP SERPL CALC-SCNC: 12 MMOL/L
BUN SERPL-MCNC: 16 MG/DL
CALCIUM SERPL-MCNC: 9.1 MG/DL
CHLORIDE SERPL-SCNC: 107 MMOL/L
CO2 SERPL-SCNC: 23 MMOL/L
CREAT SERPL-MCNC: 0.98 MG/DL
EGFR: 90 ML/MIN/1.73M2
GLUCOSE SERPL-MCNC: 99 MG/DL
POTASSIUM SERPL-SCNC: 4.2 MMOL/L
PSA SERPL-MCNC: 5.62 NG/ML
SODIUM SERPL-SCNC: 142 MMOL/L

## 2025-03-03 NOTE — DISCHARGE NOTE NURSING/CASE MANAGEMENT/SOCIAL WORK - HAS THE PATIENT RECEIVED THE INFLUENZA VACCINE THIS SEASON?
yes...
Condition:: FBSE
Please Describe Your Condition:: Notes a small itchy spot on the back. Present for years but notes a little more intensity lately on the R scapula.\\n\\nLesion in mouth. Dentist wants another look. Has been marked prior.

## 2025-04-06 ENCOUNTER — OUTPATIENT (OUTPATIENT)
Dept: OUTPATIENT SERVICES | Facility: HOSPITAL | Age: 55
LOS: 1 days | End: 2025-04-06
Payer: COMMERCIAL

## 2025-04-06 DIAGNOSIS — Z90.49 ACQUIRED ABSENCE OF OTHER SPECIFIED PARTS OF DIGESTIVE TRACT: Chronic | ICD-10-CM

## 2025-04-06 DIAGNOSIS — Z00.8 ENCOUNTER FOR OTHER GENERAL EXAMINATION: ICD-10-CM

## 2025-04-06 DIAGNOSIS — D32.9 BENIGN NEOPLASM OF MENINGES, UNSPECIFIED: ICD-10-CM

## 2025-04-06 DIAGNOSIS — Z98.890 OTHER SPECIFIED POSTPROCEDURAL STATES: Chronic | ICD-10-CM

## 2025-04-06 DIAGNOSIS — G40.909 EPILEPSY, UNSPECIFIED, NOT INTRACTABLE, WITHOUT STATUS EPILEPTICUS: ICD-10-CM

## 2025-04-06 PROCEDURE — A9585: CPT

## 2025-04-06 PROCEDURE — 70553 MRI BRAIN STEM W/O & W/DYE: CPT

## 2025-04-22 ENCOUNTER — APPOINTMENT (OUTPATIENT)
Dept: MRI IMAGING | Facility: IMAGING CENTER | Age: 55
End: 2025-04-22

## 2025-04-27 ENCOUNTER — OUTPATIENT (OUTPATIENT)
Dept: OUTPATIENT SERVICES | Facility: HOSPITAL | Age: 55
LOS: 1 days | End: 2025-04-27
Payer: COMMERCIAL

## 2025-04-27 DIAGNOSIS — Z90.49 ACQUIRED ABSENCE OF OTHER SPECIFIED PARTS OF DIGESTIVE TRACT: Chronic | ICD-10-CM

## 2025-04-27 DIAGNOSIS — Z98.890 OTHER SPECIFIED POSTPROCEDURAL STATES: Chronic | ICD-10-CM

## 2025-04-27 DIAGNOSIS — Z00.8 ENCOUNTER FOR OTHER GENERAL EXAMINATION: ICD-10-CM

## 2025-04-27 DIAGNOSIS — R97.20 ELEVATED PROSTATE SPECIFIC ANTIGEN [PSA]: ICD-10-CM

## 2025-04-27 PROCEDURE — A9585: CPT

## 2025-04-27 PROCEDURE — 76498 UNLISTED MR PROCEDURE: CPT

## 2025-04-27 PROCEDURE — 72197 MRI PELVIS W/O & W/DYE: CPT

## 2025-04-30 ENCOUNTER — APPOINTMENT (OUTPATIENT)
Dept: NEUROLOGY | Facility: CLINIC | Age: 55
End: 2025-04-30
Payer: COMMERCIAL

## 2025-04-30 ENCOUNTER — NON-APPOINTMENT (OUTPATIENT)
Age: 55
End: 2025-04-30

## 2025-04-30 VITALS
HEIGHT: 64 IN | SYSTOLIC BLOOD PRESSURE: 138 MMHG | WEIGHT: 187 LBS | RESPIRATION RATE: 16 BRPM | BODY MASS INDEX: 31.92 KG/M2 | OXYGEN SATURATION: 98 % | HEART RATE: 62 BPM | DIASTOLIC BLOOD PRESSURE: 86 MMHG | TEMPERATURE: 98.4 F

## 2025-04-30 PROCEDURE — 99214 OFFICE O/P EST MOD 30 MIN: CPT

## 2025-05-06 ENCOUNTER — APPOINTMENT (OUTPATIENT)
Dept: UROLOGY | Facility: CLINIC | Age: 55
End: 2025-05-06
Payer: COMMERCIAL

## 2025-05-06 DIAGNOSIS — R97.20 ELEVATED PROSTATE, SPECIFIC ANTIGEN [PSA]: ICD-10-CM

## 2025-05-06 PROCEDURE — G2211 COMPLEX E/M VISIT ADD ON: CPT | Mod: NC,95

## 2025-05-06 PROCEDURE — 99213 OFFICE O/P EST LOW 20 MIN: CPT | Mod: 95

## (undated) DEVICE — DRAPE C ARM UNIVERSAL

## (undated) DEVICE — ELCTR CORD FOOTSWITCH 1PLR LAPSCP 10FT

## (undated) DEVICE — APPLICATOR FOR ARISTA XL 38CM

## (undated) DEVICE — PREP CHLORAPREP HI-LITE ORANGE 26ML

## (undated) DEVICE — SUT POLYSORB 0 36" GU-46

## (undated) DEVICE — DISSECTOR ENDO PEANUT 5MM

## (undated) DEVICE — GLV 7.5 PROTEXIS (WHITE)

## (undated) DEVICE — WARMING BLANKET UPPER ADULT

## (undated) DEVICE — SYR LUER LOK 30CC

## (undated) DEVICE — DRAPE INSTRUMENT POUCH 6.75" X 11"

## (undated) DEVICE — GOWN TRIMAX LG

## (undated) DEVICE — SHEARS COVIDIEN ENDO SHEAR 5MM X 31CM W UNIPOLAR CAUTERY

## (undated) DEVICE — TUBING TUR 2 PRONG

## (undated) DEVICE — GLV 8 PROTEXIS (WHITE)

## (undated) DEVICE — TUBING IRRIGATION DAVOL SYSTEM X STREAM

## (undated) DEVICE — DRSG STERISTRIPS 0.5 X 4"

## (undated) DEVICE — SOL IRR POUR H2O 250ML

## (undated) DEVICE — SUT PDS II 0 18" ENDOLOOP LIGATURE

## (undated) DEVICE — ENDOCATCH 10MM SPECIMEN POUCH

## (undated) DEVICE — SOL IRR POUR NS 0.9% 500ML

## (undated) DEVICE — D HELP - CLEARVIEW CLEARIFY SYSTEM

## (undated) DEVICE — NDL BIOPSY MONOPTY 18G X 20CM

## (undated) DEVICE — VENODYNE/SCD SLEEVE CALF LARGE

## (undated) DEVICE — GLV 6.5 PROTEXIS (WHITE)

## (undated) DEVICE — TROCAR COVIDIEN BLUNT TIP HASSAN 10MM

## (undated) DEVICE — MEDICATION LABELS W MARKER

## (undated) DEVICE — SUT BIOSYN 4-0 18" P-12

## (undated) DEVICE — TUBING INSUFFLATION LAP FILTER 10FT

## (undated) DEVICE — DRSG OPSITE 2.5 X 2"

## (undated) DEVICE — DRSG TELFA 3 X 8

## (undated) DEVICE — SYR LUER LOK 20CC

## (undated) DEVICE — GLV 7 PROTEXIS (WHITE)

## (undated) DEVICE — PACK ADVANCED LAPAROSCOPIC NS

## (undated) DEVICE — TUBING STRYKEFLOW II SUCTION / IRRIGATOR

## (undated) DEVICE — STOPCOCK 3 WAY W TUBE 35"

## (undated) DEVICE — INSUFFLATION NDL COVIDIEN STEP 14G FOR STEP/VERSASTEP

## (undated) DEVICE — DISSECTOR COVIDIEN ROTICULATOR 5MM W MONOPOLAR CAUTERY

## (undated) DEVICE — POSITIONER FOAM EGG CRATE ULNAR 2PCS (PINK)

## (undated) DEVICE — DRAPE MAYO STAND 30"

## (undated) DEVICE — CATH IV SAFE INSYTE 14G X 1.75" (ORANGE)

## (undated) DEVICE — TUBING TRUWAVE PRESSURE MALE/FEMALE 72"

## (undated) DEVICE — GLV 8.5 PROTEXIS (WHITE)

## (undated) DEVICE — DRAPE TOWEL BLUE 17" X 24"

## (undated) DEVICE — TROCAR COVIDIEN VERSAPORT BLADELESS OPTICAL 5MM STANDARD

## (undated) DEVICE — SPECIMEN CONTAINER 100ML